# Patient Record
Sex: FEMALE | HISPANIC OR LATINO
[De-identification: names, ages, dates, MRNs, and addresses within clinical notes are randomized per-mention and may not be internally consistent; named-entity substitution may affect disease eponyms.]

---

## 2017-12-31 ENCOUNTER — CHARTING TRANS (OUTPATIENT)
Dept: OTHER | Age: 52
End: 2017-12-31

## 2018-01-04 ENCOUNTER — CHARTING TRANS (OUTPATIENT)
Dept: OTHER | Age: 53
End: 2018-01-04

## 2018-05-08 ENCOUNTER — CHARTING TRANS (OUTPATIENT)
Dept: OTHER | Age: 53
End: 2018-05-08

## 2018-07-20 ENCOUNTER — CHARTING TRANS (OUTPATIENT)
Dept: OTHER | Age: 53
End: 2018-07-20

## 2018-09-16 ENCOUNTER — CHARTING TRANS (OUTPATIENT)
Dept: OTHER | Age: 53
End: 2018-09-16

## 2018-11-23 ENCOUNTER — IMAGING SERVICES (OUTPATIENT)
Dept: OTHER | Age: 53
End: 2018-11-23

## 2019-01-24 ENCOUNTER — IMAGING SERVICES (OUTPATIENT)
Dept: OTHER | Age: 54
End: 2019-01-24

## 2019-02-14 ENCOUNTER — APPOINTMENT (OUTPATIENT)
Dept: GENERAL RADIOLOGY | Facility: HOSPITAL | Age: 54
DRG: 003 | End: 2019-02-14
Attending: NURSE PRACTITIONER
Payer: MEDICARE

## 2019-02-14 ENCOUNTER — APPOINTMENT (OUTPATIENT)
Dept: CT IMAGING | Facility: HOSPITAL | Age: 54
DRG: 003 | End: 2019-02-14
Attending: NURSE PRACTITIONER
Payer: MEDICARE

## 2019-02-14 ENCOUNTER — APPOINTMENT (OUTPATIENT)
Dept: GENERAL RADIOLOGY | Facility: HOSPITAL | Age: 54
DRG: 003 | End: 2019-02-14
Attending: HOSPITALIST
Payer: MEDICARE

## 2019-02-14 ENCOUNTER — APPOINTMENT (OUTPATIENT)
Dept: GENERAL RADIOLOGY | Facility: HOSPITAL | Age: 54
DRG: 003 | End: 2019-02-14
Attending: EMERGENCY MEDICINE
Payer: MEDICARE

## 2019-02-14 ENCOUNTER — APPOINTMENT (OUTPATIENT)
Dept: INTERVENTIONAL RADIOLOGY/VASCULAR | Facility: HOSPITAL | Age: 54
DRG: 003 | End: 2019-02-14
Attending: NURSE PRACTITIONER
Payer: MEDICARE

## 2019-02-14 ENCOUNTER — APPOINTMENT (OUTPATIENT)
Dept: CT IMAGING | Facility: HOSPITAL | Age: 54
DRG: 003 | End: 2019-02-14
Attending: EMERGENCY MEDICINE
Payer: MEDICARE

## 2019-02-14 ENCOUNTER — HOSPITAL ENCOUNTER (INPATIENT)
Facility: HOSPITAL | Age: 54
LOS: 23 days | Discharge: NURSING, REHAB OR LONG-TERM CARE | DRG: 003 | End: 2019-03-09
Attending: EMERGENCY MEDICINE | Admitting: HOSPITALIST
Payer: MEDICARE

## 2019-02-14 ENCOUNTER — APPOINTMENT (OUTPATIENT)
Dept: GENERAL RADIOLOGY | Facility: HOSPITAL | Age: 54
DRG: 003 | End: 2019-02-14
Attending: RADIOLOGY
Payer: MEDICARE

## 2019-02-14 DIAGNOSIS — Z86.73 H/O: CVA (CEREBROVASCULAR ACCIDENT): ICD-10-CM

## 2019-02-14 DIAGNOSIS — G25.3 MYOCLONUS: ICD-10-CM

## 2019-02-14 DIAGNOSIS — A41.9 SEPSIS, DUE TO UNSPECIFIED ORGANISM: ICD-10-CM

## 2019-02-14 DIAGNOSIS — K65.9: ICD-10-CM

## 2019-02-14 DIAGNOSIS — N39.0 URINARY TRACT INFECTION WITHOUT HEMATURIA: ICD-10-CM

## 2019-02-14 DIAGNOSIS — N39.0 URINARY TRACT INFECTION WITHOUT HEMATURIA, SITE UNSPECIFIED: Primary | ICD-10-CM

## 2019-02-14 DIAGNOSIS — L98.429: ICD-10-CM

## 2019-02-14 DIAGNOSIS — E03.9 HYPOTHYROIDISM: ICD-10-CM

## 2019-02-14 DIAGNOSIS — R56.9 SEIZURE (HCC): ICD-10-CM

## 2019-02-14 DIAGNOSIS — G40.901 STATUS EPILEPTICUS (HCC): ICD-10-CM

## 2019-02-14 DIAGNOSIS — N18.6 ESRD (END STAGE RENAL DISEASE) (HCC): ICD-10-CM

## 2019-02-14 DIAGNOSIS — T85.71XA: ICD-10-CM

## 2019-02-14 DIAGNOSIS — L89.159 PRESSURE INJURY OF SKIN OF SACRAL REGION, UNSPECIFIED INJURY STAGE: ICD-10-CM

## 2019-02-14 DIAGNOSIS — R41.82 ALTERED MENTAL STATUS, UNSPECIFIED ALTERED MENTAL STATUS TYPE: ICD-10-CM

## 2019-02-14 DIAGNOSIS — R63.30 FEEDING DIFFICULTIES: ICD-10-CM

## 2019-02-14 LAB
ALBUMIN SERPL-MCNC: 1.2 G/DL (ref 3.4–5)
ALBUMIN/GLOB SERPL: 0.2 {RATIO} (ref 1–2)
ALLENS TEST: POSITIVE
ALP LIVER SERPL-CCNC: 507 U/L (ref 41–108)
ALT SERPL-CCNC: 11 U/L (ref 13–56)
AMMONIA PLAS-MCNC: <10 UMOL/L (ref 11–32)
ANION GAP SERPL CALC-SCNC: 12 MMOL/L (ref 0–18)
ANION GAP SERPL CALC-SCNC: 9 MMOL/L (ref 0–18)
ARTERIAL BLD GAS O2 SATURATION: 96 % (ref 92–100)
ARTERIAL BLD GAS O2 SATURATION: 97 % (ref 92–100)
ARTERIAL BLD GAS O2 SATURATION: 97 % (ref 92–100)
ARTERIAL BLOOD GAS BASE EXCESS: -0.3
ARTERIAL BLOOD GAS BASE EXCESS: -1.5
ARTERIAL BLOOD GAS BASE EXCESS: -1.8
ARTERIAL BLOOD GAS HCO3: 21.8 MEQ/L (ref 22–26)
ARTERIAL BLOOD GAS HCO3: 23.3 MEQ/L (ref 22–26)
ARTERIAL BLOOD GAS HCO3: 23.4 MEQ/L (ref 22–26)
ARTERIAL BLOOD GAS PCO2: 32 MM HG (ref 35–45)
ARTERIAL BLOOD GAS PCO2: 34 MM HG (ref 35–45)
ARTERIAL BLOOD GAS PCO2: 38 MM HG (ref 35–45)
ARTERIAL BLOOD GAS PH: 7.4 (ref 7.35–7.45)
ARTERIAL BLOOD GAS PH: 7.45 (ref 7.35–7.45)
ARTERIAL BLOOD GAS PH: 7.45 (ref 7.35–7.45)
ARTERIAL BLOOD GAS PO2: 134 MM HG (ref 80–105)
ARTERIAL BLOOD GAS PO2: 164 MM HG (ref 80–105)
ARTERIAL BLOOD GAS PO2: 247 MM HG (ref 80–105)
AST SERPL-CCNC: 16 U/L (ref 15–37)
ATRIAL RATE: 67 BPM
BASOPHIL PERITONEAL FLUID: 0 %
BASOPHILS # BLD AUTO: 0.02 X10(3) UL (ref 0–0.2)
BASOPHILS # BLD AUTO: 0.05 X10(3) UL (ref 0–0.2)
BASOPHILS NFR BLD AUTO: 0.2 %
BASOPHILS NFR BLD AUTO: 0.4 %
BILIRUB SERPL-MCNC: 0.4 MG/DL (ref 0.1–2)
BILIRUB UR QL STRIP.AUTO: NEGATIVE
BUN BLD-MCNC: 46 MG/DL (ref 7–18)
BUN BLD-MCNC: 50 MG/DL (ref 7–18)
BUN/CREAT SERPL: 14.8 (ref 10–20)
BUN/CREAT SERPL: 15.6 (ref 10–20)
CALCIUM BLD-MCNC: 8.1 MG/DL (ref 8.5–10.1)
CALCIUM BLD-MCNC: 8.3 MG/DL (ref 8.5–10.1)
CALCULATED O2 SATURATION: 100 % (ref 92–100)
CALCULATED O2 SATURATION: 100 % (ref 92–100)
CALCULATED O2 SATURATION: 99 % (ref 92–100)
CARBOXYHEMOGLOBIN: 1.7 % SAT (ref 0–3)
CARBOXYHEMOGLOBIN: 1.7 % SAT (ref 0–3)
CARBOXYHEMOGLOBIN: 2 % SAT (ref 0–3)
CHLORIDE SERPL-SCNC: 97 MMOL/L (ref 98–107)
CHLORIDE SERPL-SCNC: 99 MMOL/L (ref 98–107)
CO2 SERPL-SCNC: 23 MMOL/L (ref 21–32)
CO2 SERPL-SCNC: 27 MMOL/L (ref 21–32)
COLOR UR AUTO: YELLOW
CREAT BLD-MCNC: 3.11 MG/DL (ref 0.55–1.02)
CREAT BLD-MCNC: 3.2 MG/DL (ref 0.55–1.02)
DEPRECATED HBV CORE AB SER IA-ACNC: 889 NG/ML (ref 18–340)
DEPRECATED RDW RBC AUTO: 85.3 FL (ref 35.1–46.3)
DEPRECATED RDW RBC AUTO: 87 FL (ref 35.1–46.3)
EOSINOPHIL # BLD AUTO: 0.04 X10(3) UL (ref 0–0.7)
EOSINOPHIL # BLD AUTO: 0.21 X10(3) UL (ref 0–0.7)
EOSINOPHIL NFR BLD AUTO: 0.4 %
EOSINOPHIL NFR BLD AUTO: 1.5 %
EOSINOPHILS PERITONEAL FLUID: 0 %
ERYTHROCYTE [DISTWIDTH] IN BLOOD BY AUTOMATED COUNT: 21.5 % (ref 11–15)
ERYTHROCYTE [DISTWIDTH] IN BLOOD BY AUTOMATED COUNT: 21.6 % (ref 11–15)
EST. AVERAGE GLUCOSE BLD GHB EST-MCNC: 111 MG/DL (ref 68–126)
FIO2: 40 %
FIO2: 40 %
GENTAMICIN SERPL-MCNC: 3.2 UG/ML
GLOBULIN PLAS-MCNC: 5.3 G/DL (ref 2.8–4.4)
GLUCOSE BLD-MCNC: 118 MG/DL (ref 70–99)
GLUCOSE BLD-MCNC: 232 MG/DL (ref 70–99)
GLUCOSE BLD-MCNC: 250 MG/DL (ref 70–99)
GLUCOSE BLD-MCNC: 283 MG/DL (ref 70–99)
GLUCOSE BLD-MCNC: 303 MG/DL (ref 70–99)
GLUCOSE BLD-MCNC: 67 MG/DL (ref 70–99)
GLUCOSE BLD-MCNC: 72 MG/DL (ref 70–99)
GLUCOSE BLD-MCNC: 88 MG/DL (ref 70–99)
GLUCOSE BLD-MCNC: 99 MG/DL (ref 70–99)
GLUCOSE UR STRIP.AUTO-MCNC: NEGATIVE MG/DL
HBA1C MFR BLD HPLC: 5.5 % (ref ?–5.7)
HCG SERPL QL: NEGATIVE
HCT VFR BLD AUTO: 24.6 % (ref 35–48)
HCT VFR BLD AUTO: 24.7 % (ref 35–48)
HGB BLD-MCNC: 7.4 G/DL (ref 12–16)
HGB BLD-MCNC: 7.6 G/DL (ref 12–16)
IMM GRANULOCYTES # BLD AUTO: 0.08 X10(3) UL (ref 0–1)
IMM GRANULOCYTES # BLD AUTO: 0.15 X10(3) UL (ref 0–1)
IMM GRANULOCYTES NFR BLD: 0.7 %
IMM GRANULOCYTES NFR BLD: 1.1 %
INR BLD: 0.93 (ref 0.9–1.1)
IONIZED CALCIUM: 1.11 MMOL/L (ref 1.12–1.32)
IRON SATURATION: 38 % (ref 15–50)
IRON SERPL-MCNC: 50 UG/DL (ref 50–170)
KETONES UR STRIP.AUTO-MCNC: NEGATIVE MG/DL
L/M: 5 L/MIN
LACTATE SERPL-SCNC: 1.6 MMOL/L (ref 0.4–2)
LACTATE SERPL-SCNC: 2.2 MMOL/L (ref 0.4–2)
LACTATE SERPL-SCNC: 2.7 MMOL/L (ref 0.4–2)
LACTIC ACID ARTERIAL: 1.5 MMOL/L (ref 0.5–2)
LYMPHOCYTE PERITONEAL FLUID: 37 %
LYMPHOCYTES # BLD AUTO: 1.13 X10(3) UL (ref 1–4)
LYMPHOCYTES # BLD AUTO: 2.05 X10(3) UL (ref 1–4)
LYMPHOCYTES NFR BLD AUTO: 10.4 %
LYMPHOCYTES NFR BLD AUTO: 14.4 %
M PROTEIN MFR SERPL ELPH: 6.5 G/DL (ref 6.4–8.2)
MCH RBC QN AUTO: 33.3 PG (ref 26–34)
MCH RBC QN AUTO: 33.8 PG (ref 26–34)
MCHC RBC AUTO-ENTMCNC: 30 G/DL (ref 31–37)
MCHC RBC AUTO-ENTMCNC: 30.9 G/DL (ref 31–37)
MCV RBC AUTO: 109.3 FL (ref 80–100)
MCV RBC AUTO: 111.3 FL (ref 80–100)
METHEMOGLOBIN: 0.4 % SAT (ref 0.4–1.5)
METHEMOGLOBIN: 0.5 % SAT (ref 0.4–1.5)
METHEMOGLOBIN: 0.5 % SAT (ref 0.4–1.5)
MONO/MAC/HISTIO PERITONEAL: 42 %
MONOCYTES # BLD AUTO: 0.7 X10(3) UL (ref 0.1–1)
MONOCYTES # BLD AUTO: 1.27 X10(3) UL (ref 0.1–1)
MONOCYTES NFR BLD AUTO: 6.4 %
MONOCYTES NFR BLD AUTO: 8.9 %
NEUTROPHILS # BLD AUTO: 10.46 X10 (3) UL (ref 1.5–7.7)
NEUTROPHILS # BLD AUTO: 10.46 X10(3) UL (ref 1.5–7.7)
NEUTROPHILS # BLD AUTO: 8.9 X10 (3) UL (ref 1.5–7.7)
NEUTROPHILS # BLD AUTO: 8.9 X10(3) UL (ref 1.5–7.7)
NEUTROPHILS NFR BLD AUTO: 73.7 %
NEUTROPHILS NFR BLD AUTO: 81.9 %
NEUTROPHILS PERITONEAL FLUID: 21 %
NITRITE UR QL STRIP.AUTO: NEGATIVE
OSMOLALITY SERPL CALC.SUM OF ELEC: 290 MOSM/KG (ref 275–295)
OSMOLALITY SERPL CALC.SUM OF ELEC: 299 MOSM/KG (ref 275–295)
P AXIS: 59 DEGREES
P-R INTERVAL: 150 MS
PATIENT TEMPERATURE: 97.1 F
PATIENT TEMPERATURE: 98.6 F
PATIENT TEMPERATURE: 99 F
PEEP: 5 CM H2O
PEEP: 5 CM H2O
PH UR STRIP.AUTO: 7 [PH] (ref 4.5–8)
PHENYTOIN SERPL-MCNC: 18.1 UG/ML (ref 10–20)
PLATELET # BLD AUTO: 734 10(3)UL (ref 150–450)
PLATELET # BLD AUTO: 809 10(3)UL (ref 150–450)
POTASSIUM BLOOD GAS: 3.3 MMOL/L (ref 3.6–5.1)
POTASSIUM SERPL-SCNC: 3 MMOL/L (ref 3.5–5.1)
POTASSIUM SERPL-SCNC: 3.2 MMOL/L (ref 3.5–5.1)
PROT UR STRIP.AUTO-MCNC: 100 MG/DL
PSA SERPL DL<=0.01 NG/ML-MCNC: 12.8 SECONDS (ref 12.4–14.7)
Q-T INTERVAL: 448 MS
QRS DURATION: 94 MS
QTC CALCULATION (BEZET): 473 MS
R AXIS: 39 DEGREES
RBC # BLD AUTO: 2.22 X10(6)UL (ref 3.8–5.3)
RBC # BLD AUTO: 2.25 X10(6)UL (ref 3.8–5.3)
RBC #/AREA URNS AUTO: >10 /HPF
RBC PERITONEAL FLUID: <3000 /MM3
SODIUM BLOOD GAS: 133 MMOL/L (ref 136–144)
SODIUM SERPL-SCNC: 133 MMOL/L (ref 136–145)
SODIUM SERPL-SCNC: 134 MMOL/L (ref 136–145)
SP GR UR STRIP.AUTO: 1.04 (ref 1–1.03)
T AXIS: 133 DEGREES
TIDAL VOLUME: 400 ML
TIDAL VOLUME: 400 ML
TOTAL CELLS COUNTED: 100
TOTAL HEMOGLOBIN: 7.8 G/DL (ref 11.7–16)
TOTAL HEMOGLOBIN: 8 G/DL (ref 11.7–16)
TOTAL HEMOGLOBIN: 8.2 G/DL (ref 11.7–16)
TOTAL IRON BINDING CAPACITY: 133 UG/DL (ref 240–450)
TRANSFERRIN SERPL-MCNC: 89 MG/DL (ref 200–360)
TRIGL SERPL-MCNC: 444 MG/DL (ref 30–149)
UROBILINOGEN UR STRIP.AUTO-MCNC: <2 MG/DL
VANCOMYCIN SERPL-MCNC: 16.6 UG/ML
VENT RATE: 16 /MIN
VENT RATE: 16 /MIN
VENTRICULAR RATE: 67 BPM
WBC # BLD AUTO: 10.9 X10(3) UL (ref 4–11)
WBC # BLD AUTO: 14.2 X10(3) UL (ref 4–11)
WBC #/AREA URNS AUTO: >50 /HPF
WBC CLUMPS UR QL AUTO: PRESENT
WBC PERITONEAL FLUID: 299 /MM3

## 2019-02-14 PROCEDURE — 99223 1ST HOSP IP/OBS HIGH 75: CPT | Performed by: INTERNAL MEDICINE

## 2019-02-14 PROCEDURE — B548ZZA ULTRASONOGRAPHY OF SUPERIOR VENA CAVA, GUIDANCE: ICD-10-PCS | Performed by: RADIOLOGY

## 2019-02-14 PROCEDURE — 95816 EEG AWAKE AND DROWSY: CPT | Performed by: OTHER

## 2019-02-14 PROCEDURE — 71045 X-RAY EXAM CHEST 1 VIEW: CPT | Performed by: NURSE PRACTITIONER

## 2019-02-14 PROCEDURE — 70450 CT HEAD/BRAIN W/O DYE: CPT | Performed by: EMERGENCY MEDICINE

## 2019-02-14 PROCEDURE — 71045 X-RAY EXAM CHEST 1 VIEW: CPT | Performed by: RADIOLOGY

## 2019-02-14 PROCEDURE — 99222 1ST HOSP IP/OBS MODERATE 55: CPT | Performed by: STUDENT IN AN ORGANIZED HEALTH CARE EDUCATION/TRAINING PROGRAM

## 2019-02-14 PROCEDURE — 02HV33Z INSERTION OF INFUSION DEVICE INTO SUPERIOR VENA CAVA, PERCUTANEOUS APPROACH: ICD-10-PCS | Performed by: RADIOLOGY

## 2019-02-14 PROCEDURE — 71045 X-RAY EXAM CHEST 1 VIEW: CPT | Performed by: EMERGENCY MEDICINE

## 2019-02-14 PROCEDURE — 5A1955Z RESPIRATORY VENTILATION, GREATER THAN 96 CONSECUTIVE HOURS: ICD-10-PCS | Performed by: ANESTHESIOLOGY

## 2019-02-14 PROCEDURE — 99223 1ST HOSP IP/OBS HIGH 75: CPT | Performed by: OTHER

## 2019-02-14 PROCEDURE — 71045 X-RAY EXAM CHEST 1 VIEW: CPT | Performed by: HOSPITALIST

## 2019-02-14 PROCEDURE — 0BH17EZ INSERTION OF ENDOTRACHEAL AIRWAY INTO TRACHEA, VIA NATURAL OR ARTIFICIAL OPENING: ICD-10-PCS | Performed by: ANESTHESIOLOGY

## 2019-02-14 PROCEDURE — 99291 CRITICAL CARE FIRST HOUR: CPT | Performed by: HOSPITALIST

## 2019-02-14 PROCEDURE — 70450 CT HEAD/BRAIN W/O DYE: CPT | Performed by: NURSE PRACTITIONER

## 2019-02-14 RX ORDER — LORAZEPAM 2 MG/ML
1 INJECTION INTRAMUSCULAR ONCE
Status: COMPLETED | OUTPATIENT
Start: 2019-02-14 | End: 2019-02-14

## 2019-02-14 RX ORDER — LIDOCAINE HYDROCHLORIDE 40 MG/ML
SOLUTION TOPICAL DAILY
COMMUNITY
End: 2019-02-14

## 2019-02-14 RX ORDER — LEVOTHYROXINE SODIUM 0.12 MG/1
125 TABLET ORAL
Status: DISCONTINUED | OUTPATIENT
Start: 2019-02-14 | End: 2019-02-26

## 2019-02-14 RX ORDER — POTASSIUM CHLORIDE 14.9 MG/ML
20 INJECTION INTRAVENOUS ONCE
Status: DISCONTINUED | OUTPATIENT
Start: 2019-02-14 | End: 2019-02-14

## 2019-02-14 RX ORDER — CHLORHEXIDINE GLUCONATE 0.12 MG/ML
15 RINSE ORAL
Status: DISCONTINUED | OUTPATIENT
Start: 2019-02-14 | End: 2019-03-09

## 2019-02-14 RX ORDER — ACETAMINOPHEN 325 MG/1
650 TABLET ORAL EVERY 8 HOURS PRN
Status: ON HOLD | COMMUNITY
End: 2019-03-09

## 2019-02-14 RX ORDER — POLYETHYLENE GLYCOL 3350 17 G/17G
17 POWDER, FOR SOLUTION ORAL DAILY PRN
Status: DISCONTINUED | OUTPATIENT
Start: 2019-02-14 | End: 2019-02-14

## 2019-02-14 RX ORDER — LEVOTHYROXINE SODIUM 0.12 MG/1
125 TABLET ORAL
Status: ON HOLD | COMMUNITY
End: 2019-03-09

## 2019-02-14 RX ORDER — PHENYTOIN SODIUM 50 MG/ML
100 INJECTION, SOLUTION INTRAMUSCULAR; INTRAVENOUS EVERY 8 HOURS SCHEDULED
Status: DISCONTINUED | OUTPATIENT
Start: 2019-02-14 | End: 2019-02-16

## 2019-02-14 RX ORDER — TRAMADOL HYDROCHLORIDE 50 MG/1
50 TABLET ORAL 3 TIMES DAILY
Status: ON HOLD | COMMUNITY
End: 2019-03-09

## 2019-02-14 RX ORDER — SODIUM CHLORIDE 9 MG/ML
INJECTION, SOLUTION INTRAVENOUS CONTINUOUS
Status: DISCONTINUED | OUTPATIENT
Start: 2019-02-14 | End: 2019-02-14

## 2019-02-14 RX ORDER — BACLOFEN 5 MG/1
5 TABLET ORAL 2 TIMES DAILY
Status: ON HOLD | COMMUNITY
End: 2019-03-09

## 2019-02-14 RX ORDER — BISACODYL 10 MG
10 SUPPOSITORY, RECTAL RECTAL
Status: DISCONTINUED | OUTPATIENT
Start: 2019-02-14 | End: 2019-02-14

## 2019-02-14 RX ORDER — SODIUM CHLORIDE 9 MG/ML
INJECTION, SOLUTION INTRAVENOUS CONTINUOUS
Status: DISCONTINUED | OUTPATIENT
Start: 2019-02-14 | End: 2019-02-15

## 2019-02-14 RX ORDER — LIDOCAINE 4 G/G
1 PATCH TOPICAL DAILY
Status: ON HOLD | COMMUNITY
End: 2019-03-09

## 2019-02-14 RX ORDER — METOCLOPRAMIDE HYDROCHLORIDE 5 MG/ML
5 INJECTION INTRAMUSCULAR; INTRAVENOUS EVERY 8 HOURS PRN
Status: DISCONTINUED | OUTPATIENT
Start: 2019-02-14 | End: 2019-02-25

## 2019-02-14 RX ORDER — ISOSORBIDE MONONITRATE 10 MG/1
10 TABLET ORAL 2 TIMES DAILY
Status: ON HOLD | COMMUNITY
End: 2019-03-09

## 2019-02-14 RX ORDER — ACETAMINOPHEN 325 MG/1
650 TABLET ORAL EVERY 6 HOURS PRN
Status: DISCONTINUED | OUTPATIENT
Start: 2019-02-14 | End: 2019-02-26

## 2019-02-14 RX ORDER — DEXTROSE MONOHYDRATE 25 G/50ML
50 INJECTION, SOLUTION INTRAVENOUS
Status: DISCONTINUED | OUTPATIENT
Start: 2019-02-14 | End: 2019-03-09

## 2019-02-14 RX ORDER — ISOSORBIDE DINITRATE 10 MG/1
10 TABLET ORAL 2 TIMES DAILY
COMMUNITY
End: 2019-02-14

## 2019-02-14 RX ORDER — ONDANSETRON 2 MG/ML
4 INJECTION INTRAMUSCULAR; INTRAVENOUS EVERY 6 HOURS PRN
Status: DISCONTINUED | OUTPATIENT
Start: 2019-02-14 | End: 2019-03-09

## 2019-02-14 RX ORDER — VIT B COMP NO.3/FOLIC/C/BIOTIN 1 MG-60 MG
1 TABLET ORAL
Status: ON HOLD | COMMUNITY
End: 2019-02-14

## 2019-02-14 RX ORDER — BUMETANIDE 2 MG/1
2 TABLET ORAL DAILY
Status: ON HOLD | COMMUNITY
End: 2019-03-09

## 2019-02-14 RX ORDER — CLOPIDOGREL BISULFATE 75 MG/1
75 TABLET ORAL DAILY
Status: DISCONTINUED | OUTPATIENT
Start: 2019-02-14 | End: 2019-02-26

## 2019-02-14 RX ORDER — FOLIC ACID 1 MG/1
1 TABLET ORAL DAILY
COMMUNITY

## 2019-02-14 RX ORDER — GABAPENTIN 100 MG/1
200 CAPSULE ORAL 3 TIMES DAILY
Status: ON HOLD | COMMUNITY
End: 2019-03-09

## 2019-02-14 RX ORDER — HYDROCODONE BITARTRATE AND ACETAMINOPHEN 5; 325 MG/1; MG/1
1 TABLET ORAL EVERY 8 HOURS PRN
Status: ON HOLD | COMMUNITY
End: 2019-03-09

## 2019-02-14 RX ORDER — ERGOCALCIFEROL (VITAMIN D2) 1250 MCG
50000 CAPSULE ORAL WEEKLY
Status: ON HOLD | COMMUNITY
End: 2019-03-09

## 2019-02-14 RX ORDER — BISACODYL 10 MG
10 SUPPOSITORY, RECTAL RECTAL
Status: DISCONTINUED | OUTPATIENT
Start: 2019-02-14 | End: 2019-03-09

## 2019-02-14 RX ORDER — METOCLOPRAMIDE 5 MG/1
5 TABLET ORAL EVERY 8 HOURS
Status: ON HOLD | COMMUNITY
End: 2019-03-09

## 2019-02-14 RX ORDER — ACETAMINOPHEN 325 MG/1
650 TABLET ORAL EVERY 6 HOURS PRN
Status: DISCONTINUED | OUTPATIENT
Start: 2019-02-14 | End: 2019-02-14

## 2019-02-14 RX ORDER — POLYETHYLENE GLYCOL 3350 17 G/17G
17 POWDER, FOR SOLUTION ORAL DAILY PRN
Status: DISCONTINUED | OUTPATIENT
Start: 2019-02-14 | End: 2019-03-09

## 2019-02-14 RX ORDER — CLONAZEPAM 0.5 MG/1
0.5 TABLET ORAL SEE ADMIN INSTRUCTIONS
Status: ON HOLD | COMMUNITY
End: 2019-03-09

## 2019-02-14 RX ORDER — CALCIUM CARBONATE 200(500)MG
500 TABLET,CHEWABLE ORAL 2 TIMES DAILY
Status: DISCONTINUED | OUTPATIENT
Start: 2019-02-14 | End: 2019-02-15

## 2019-02-14 RX ORDER — BUMETANIDE 2 MG/1
2 TABLET ORAL DAILY
Status: DISCONTINUED | OUTPATIENT
Start: 2019-02-14 | End: 2019-02-14

## 2019-02-14 RX ORDER — CLOPIDOGREL BISULFATE 75 MG/1
75 TABLET ORAL DAILY
Status: ON HOLD | COMMUNITY
End: 2019-03-09

## 2019-02-14 RX ORDER — URSODIOL 300 MG/1
300 CAPSULE ORAL EVERY 8 HOURS
Status: ON HOLD | COMMUNITY
End: 2019-03-09

## 2019-02-14 RX ORDER — ACETAMINOPHEN 650 MG/1
650 SUPPOSITORY RECTAL EVERY 6 HOURS PRN
Status: DISCONTINUED | OUTPATIENT
Start: 2019-02-14 | End: 2019-02-26

## 2019-02-14 RX ORDER — HEPARIN SODIUM 5000 [USP'U]/ML
5000 INJECTION, SOLUTION INTRAVENOUS; SUBCUTANEOUS EVERY 12 HOURS SCHEDULED
Status: DISPENSED | OUTPATIENT
Start: 2019-02-14 | End: 2019-03-05

## 2019-02-14 RX ORDER — PANTOPRAZOLE SODIUM 40 MG/1
1 GRANULE, DELAYED RELEASE ORAL
Status: ON HOLD | COMMUNITY
End: 2019-03-09

## 2019-02-14 RX ORDER — ACETAMINOPHEN 160 MG/5ML
650 SOLUTION ORAL EVERY 6 HOURS PRN
Status: DISCONTINUED | OUTPATIENT
Start: 2019-02-14 | End: 2019-02-26

## 2019-02-14 RX ORDER — METRONIDAZOLE 500 MG/1
500 TABLET ORAL EVERY 8 HOURS
Status: ON HOLD | COMMUNITY
Start: 2019-02-05 | End: 2019-03-09

## 2019-02-14 RX ORDER — LOPERAMIDE HYDROCHLORIDE 2 MG/1
1 TABLET ORAL EVERY 12 HOURS PRN
Status: ON HOLD | COMMUNITY
End: 2019-03-09

## 2019-02-14 RX ORDER — FOLIC ACID/VIT B COMPLEX AND C 0.8 MG
0.8 TABLET ORAL DAILY
COMMUNITY

## 2019-02-14 RX ORDER — LORAZEPAM 2 MG/ML
INJECTION INTRAMUSCULAR
Status: COMPLETED
Start: 2019-02-14 | End: 2019-02-14

## 2019-02-14 RX ORDER — HEPARIN SODIUM 5000 [USP'U]/ML
5000 INJECTION, SOLUTION INTRAVENOUS; SUBCUTANEOUS 2 TIMES DAILY
Status: ON HOLD | COMMUNITY
End: 2019-03-09

## 2019-02-14 RX ORDER — BISACODYL 10 MG
10 SUPPOSITORY, RECTAL RECTAL
Status: ON HOLD | COMMUNITY
End: 2019-03-09

## 2019-02-14 RX ORDER — BACLOFEN 10 MG/1
5 TABLET ORAL 2 TIMES DAILY
COMMUNITY
End: 2019-02-14

## 2019-02-14 RX ORDER — MODAFINIL 100 MG/1
100 TABLET ORAL DAILY
Status: ON HOLD | COMMUNITY
End: 2019-03-09

## 2019-02-14 RX ORDER — ONDANSETRON HYDROCHLORIDE 24 MG/1
4 TABLET, FILM COATED ORAL EVERY 6 HOURS PRN
Status: ON HOLD | COMMUNITY
End: 2019-03-09

## 2019-02-14 RX ORDER — MIDAZOLAM HYDROCHLORIDE 1 MG/ML
2 INJECTION INTRAMUSCULAR; INTRAVENOUS EVERY 5 MIN PRN
Status: DISCONTINUED | OUTPATIENT
Start: 2019-02-14 | End: 2019-03-09

## 2019-02-14 RX ORDER — MIDODRINE HYDROCHLORIDE 10 MG/1
10 TABLET ORAL EVERY 8 HOURS PRN
Status: ON HOLD | COMMUNITY
End: 2019-03-09

## 2019-02-14 RX ORDER — LOPERAMIDE HCL 1 MG/7.5ML
2 SOLUTION ORAL 2 TIMES DAILY PRN
Status: ON HOLD | COMMUNITY
End: 2019-02-14

## 2019-02-14 RX ORDER — FOLIC ACID 1 MG/1
1 TABLET ORAL DAILY
Status: DISCONTINUED | OUTPATIENT
Start: 2019-02-14 | End: 2019-02-14

## 2019-02-14 NOTE — PROGRESS NOTES
[facility]  Progress Note     Nohemi Able Patient Status:  Inpatient    1965 MRN [id number]   [facility] 4NW-A Attending Freddy Ramos MD   Hosp Day # 0 PCP Alicia Antonio MD     Chief Complaint: RR for seizing    S: Patient UTI  3. Initially conern was for CVA- after ED clarified with NH it appears EMS called due to lethargy ~ 5 pm   4. ABG reviewed  5.  Patient started seizing as soon as she arrived to the floor, Ativan iv given and patient was intubated for airway protection

## 2019-02-14 NOTE — BRIEF PROCEDURE NOTE
Called for intubation of ESRD with hx of seizures in Respitratory distress  Eyes open, obtunded, being bag masked.   120/78, O2 sat 10%  K3.2    Etomidate 10mg   Succinylcholine 60mg  glidescope3   x1 attempt, atraumatic  +bbs, +etco2  Tube taped at 21cm at

## 2019-02-14 NOTE — PHYSICAL THERAPY NOTE
PT order received, chart reviewed. Pt with RR d/t seizure this AM, with intubation and transfer to ICU in progress. Pt will require new PT order when pt is appropriate to participate in skilled therapy, including during ICU stay.

## 2019-02-14 NOTE — ED NOTES
Report given to Med/Onc Charge RN - Vu Luke x [phone] at [phone]. Pt is going to Rm. 407, bed is still unavailable at this time. Will page transport when the bed is ready.

## 2019-02-14 NOTE — VASCULAR ACCESS
To pt's room to assess for IV access. Both right and left upper and lower arms assessed with ultrasound for IV access. Unable to see any viable vein to access. Pt's nurse, Margrett Lesches notified.

## 2019-02-14 NOTE — CM/SW NOTE
Med Records from [address] [place] [address] both received and placed on chart.   Naz Whelan, 02/14/19, 3:49 PM

## 2019-02-14 NOTE — ED NOTES
9852 FA[address] Carrasquillo NH Nurse called here for an update regarding the Pt. She stated that she will call the Pt's Son to update him that Pt is being admitted here.

## 2019-02-14 NOTE — ED INITIAL ASSESSMENT (HPI)
Pt from [address] home for evaluation. pts last known normal was 1700 tonight.      Pt presences with right sided weakness       Pt has hx of stroke with left side deficit, per EMS pts normal is she is able to make some sounds

## 2019-02-14 NOTE — CONSULTS
[facility] Note:  Renal Dose Adjustment for Metoclopramide (REGLAN)    Hipolito Paulino has been prescribed Metoclopramide (REGLAN) 10 mg every 8 hours as needed for nausea. Estimated Creatinine Clearance: 16.9 mL/min (A) (based on SCr of 3.11 mg/dL (H)).

## 2019-02-14 NOTE — CM/SW NOTE
Requested by RN to get STAT medical records on this critically ill patient. Yeimi Hood): phone [phone]; fax [phone]. Marta Adrian [place]) : phone [phone]; fax [phone]    Faxed critical illness request LUI to both above.

## 2019-02-14 NOTE — CONSULTS
INFECTIOUS DISEASE CONSULTATION    Kylah Escudero Patient Status:  Inpatient    1965 MRN [id number]   [facility] 4SW-A Attending Larry Cortez MD   Hosp Day # 0 PCP Ventura Pierre MD injection 25 mcg, 25 mcg, Intravenous, Q30 Min PRN **OR** fentaNYL citrate (SUBLIMAZE) 0.05 MG/ML injection 50 mcg, 50 mcg, Intravenous, Q30 Min PRN  •  acetaminophen (TYLENOL) tab 650 mg, 650 mg, Oral, Q6H PRN **OR** acetaminophen (TYLENOL) 160 MG/5ML ora MG Oral Tab EC 5 mg Q24H PRN for constipation. At hs via PEG TUBE  Disp:  Rfl:    bumetanide 2 MG Oral Tab 2 mg daily. Per PEG TUBE  Disp:  Rfl:    ClonazePAM 0.5 MG Oral Tab 0.5 mg by Per G Tube route See Admin Instructions.  Yelena Knight, Sat for anxiety 30 m (ARANESP, ALBUMIN FREE,) 40 MCG/ML Injection Solution Inject 1 mL into the skin one time. During HD 2/14/19 for 1 day Disp:  Rfl:    Baclofen 5 MG Oral Tab Take 5 mg by mouth 2 (two) times daily.  Disp:  Rfl:    insulin glargine (BASAGLAR KWIKPEN) 100 UNIT/ cath  Musculoskeletal: Full range of motion of all extremities. No swelling noted. Joints: no effusions  Skin: No lesions.  No erythema, no open wounds      Laboratory Data:      Recent Labs   Lab  02/14/19   0201   RBC  2.25*   HGB  7.6*   HCT  24.6*   M aspiration sputum culture to be sent    4.  Wounds/buttocks appear clean cont local wound care        Tasha Vila MD  [facility]  [phone]

## 2019-02-14 NOTE — CONSULTS
[id number] [facility] Initial Consultation    Jameycrys Bob Patient Status:  Inpatient    1965 MRN [id number]   [facility] 4SW-A Attending Ck Herr MD   Hosp Day # 0 PCP Dao Farooq MD     REASON FOR EVALUATION: Joselin Butler Metoclopramide HCl (REGLAN) injection 5 mg 5 mg Intravenous Q8H PRN   Calcium Carbonate Antacid (TUMS) chewable tab 500 mg 500 mg Per G Tube BID   [START ON 2/15/2019] GENTAMICIN 100MG IV PREMIX 60 mg 60 mg Intravenous Once per day on Mon Wed Fri   insul flexpen 2-10 Units 2-10 Units Subcutaneous TID CC and HS   [START ON 2/15/2019] epoetin geetha (EPOGEN,PROCRIT) injection 10,000 Units 10,000 Units Intravenous Once in dialysis       REVIEW OF SYSTEMS:  A 10-point system was reviewed.   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

## 2019-02-14 NOTE — ED NOTES
Salomón Barkers here in the ED and was made aware of Pt's K level of 3.2 \"It's OK. She's on dialysis. \". No further orders were given.

## 2019-02-14 NOTE — H&P
[facility]  History and Physical     Jaleesa Babb Patient Status:  Emergency    1965 MRN [id number]   Location [address] [facility] Attending Tai Chinchilla, [facility] Day # 0 PCP Ventura Hernandez MD     Chief Complaint: Jazmine Avila Take 100 mg by mouth 3 (three) times daily. Disp:  Rfl:    Gentamicin in Saline (GENTAMICIN 100MG IV PREMIX) 60 mg. Disp:  Rfl:    Heparin Sodium, Porcine, 5000 UNIT/ML Injection Solution Inject 5,000 Units into the skin 2 (two) times daily.  Disp:  Rfl: /72   Pulse 82   Temp 97.1 °F (36.2 °C) (Rectal)   Resp 10   Ht 5' 3\" (1.6 m)   Wt 180 lb (81.6 kg)   SpO2 96%   BMI 31.89 kg/m²   General: No acute distress. Alert and oriented x 3. HEENT: Normocephalic atraumatic. Moist mucous membranes.  EOM-I

## 2019-02-14 NOTE — ED NOTES
Received pt sleeping, arousable to painful stimuli, VS noted WNL. Awaiting room in unit to be cleaned for transfer. Daughter in law at bedside, updated on bed assignment. Fluids infusing per MD order.

## 2019-02-14 NOTE — ED NOTES
ED Provider - Dr. Germaine gomes to [place] Nurse for report. Per nurse, they called the ambulance because \"Pt has been less alert than usual... Pt is normally moans and states 1-2 words, since 5PM she was noticed to be more lethargic.  Pt has history of left-

## 2019-02-14 NOTE — PROCEDURES
[facility]  Procedure Note    Tova Leone Patient Status:  Inpatient    1965 MRN [id number]   Location [address] [facility] Attending Jesus Soto MD   Hosp Day # 0 PCP Jaci Espinoza MD     Procedure: Triple lumen catheter chris

## 2019-02-14 NOTE — PROCEDURES
[address] EEG report    Name: Gonzales Zheng    Date of Study: 2/14/2019      Routine EEG Report    Ordering Physician: Marco A Velaqzuez MD                              Primary Care Physician: Elsa Canseco MD    Technical Aspects Oral Daily PRN   bisacodyl (DULCOLAX) rectal suppository 10 mg 10 mg Rectal Daily PRN   Chlorhexidine Gluconate (PERIDEX) 0.12 % solution 15 mL 15 mL Mouth/Throat Roro@[employer]   Midazolam HCl (VERSED) 2 MG/2ML injection 2 mg 2 mg Intravenous Q5 Min PRN noted    Hyperventilation and photic stimulation were not performed     Epileptiform activity: None    Seizures: none    Events: none    Impression:    This EEG is abnormal due to diffuse irregular continuous slowing, with intermixed frequent bursts of epil

## 2019-02-14 NOTE — CONSULTS
[facility]  Report of Consultation    Sagelindy Bentley Patient Status:  Inpatient    1965 MRN [id number]   [facility] 4SW-A Attending Funmilayo Shelby MD   Hosp Day # 0 PCP Chano Nichole MD     Reason for Consultation:  Altered menta • Esophageal reflux    • Essential hypertension    • Osteomyelitis (HCC)    • Stroke [facility])    • Thyroid disease      No family history on file. reports that  has never smoked.  she has never used smokeless tobacco.    Allergies:  No Known Allergies MG Oral Tab 75 mg daily. PEG-TUBE  Disp:  Rfl:     Pantoprazole Sodium (PROTONIX) 40 MG Oral Powd Pack 1 Package by Peg Tube route. Disp:  Rfl:     TraMADol HCl 50 MG Oral Tab 50 mg 3 (three) times daily.  Per peg tube  Disp:  Rfl:     ursodiol 300 MG Ora form of dialysis and no mention made of recent diarrhea issues.   She is an ongoing wound attention  No mention of use of oxygen in the orders from the nursing      Vital signs in last 24 hours:  Patient Vitals for the past 24 hrs:   BP Temp Temp src Pulse ischemia   Skin: As above   Neurological: Left hand appears contracted greater than right with left leg without focal movement  Seems to respond to pain currently no active seizure    Lab Data Review:  Lab Results   Component Value Date    WBC 10.9 02/14/2 EF on record 9/17 of 40% with normal RV        Plan at this time to continue full ventilatory support  EEG ongoing with neuro consult pending-interim propofol for seizure control  Awaiting broad-spectrum cultures  As per renal service  Further recommendati

## 2019-02-14 NOTE — ED PROVIDER NOTES
Patient Seen in: [facility] Emergency Department    History   Patient presents with:  Stroke (neurologic)    Stated Complaint: stroke    HPI    52-year-old female with history of diabetes mellitus, hypertension, end-stage renal disease on dialysis, extraocular muscles are intact, there is no scleral icterus. Mucous membranes are moist, oropharynx is clear, uvula midline. Tympanic membranes clear bilaterally, there is no mass or erythema or swelling bilaterally. Scalp is atraumatic.   NECK: Neck is MORPHOLOGY SCAN - Abnormal; Notable for the following components:    RBC Morphology See morphology below (*)     Platelet Morphology See morphology below (*)     Clumped Platelets 2+ (*)     All other components within normal limits   POCT GLUCOSE - Abnorm Technologist): Additional Information (per Vision Radiologist): AMS, possible urosepsis, history of CVA, no new focal deficits tonight      CT HEAD      IMPRESSION:  No acute intracranial hemorrhage, mass-effect, or midline shift.   Old right PCA territ (primary encounter diagnosis)  Urinary tract infection without hematuria, site unspecified  ESRD (end stage renal disease) (HCC)  Pressure injury of skin of sacral region, unspecified injury stage    Disposition:  Admit  2/14/2019  4:32 am    Follow-up:  N

## 2019-02-14 NOTE — PROGRESS NOTES
[id number] [facility] Preliminary Note    Delma Russell Patient Status:  Inpatient    1965 MRN [id number]   [facility] 4SW-A Attending Jacob Chaudhry MD   Hosp Day # 0 PCP Billy Amos MD     REASON FOR EVALUATION: Generalize tab 500 mg 500 mg Per G Tube BID   [START ON 2/15/2019] GENTAMICIN 100MG IV PREMIX 60 mg 60 mg Intravenous Once per day on Mon Wed Fri   insulin detemir (LEVEMIR) 100 UNIT/ML flextouch 20 Units 20 Units Subcutaneous Daily   propofol (DIPRIVAN) 10 MG/ML inj name.  +gag per RN  Per chart review, NH staff reports that she has chronic L-sided weakness, has mild strength RUE. This author unable to assess currently.       DIAGNOSTIC DATA:  Labs:  Recent Labs   Lab  02/14/19   0201   RBC  2.25*   HGB  7.6*   HCT  2

## 2019-02-14 NOTE — PROGRESS NOTES
At around 0810 pt came to floor from ED, unresponsive, rm 407 was not ready yet, pt is contact for VRE in the urine, per transport pt was already twitching when he picked the pt from ed, CN stated upon transferring pt to bed, pt's twitching was worsening a

## 2019-02-14 NOTE — PROGRESS NOTES
[facility]      Sepsis Reassessment Note    /54   Pulse 81   Temp 98.9 °F (37.2 °C) (Skin)   Resp 17   Ht 160 cm (5' 3\")   Wt 180 lb (81.6 kg)   SpO2 100%   BMI 31.89 kg/m²      5:29 PM    Cardiac:  Regularity: Regular  Rate: Normal  Heart So

## 2019-02-14 NOTE — CONSULTS
[facility]  Report of Consultation    Osvaldo Do Patient Status:  Inpatient    1965 MRN [id number]   [facility] 4SW-A Attending Shannon Almaguer MD   Hosp Day # 0 PCP Guy Schwab, MD       Assessment / Plan:    1) ESRD- due to diab smoked.  she has never used smokeless tobacco.    Allergies:  No Known Allergies    Medications:    Current Facility-Administered Medications:   •  Clopidogrel Bisulfate (PLAVIX) tab 75 mg, 75 mg, Oral, Daily  •  Levothyroxine Sodium (SYNTHROID, LEVOTHROID) Intravenous, Continuous  •  CefTRIAXone Sodium (ROCEPHIN) 1 g in sodium chloride 0.9% 100 mL MBP/ADD-vantage, 1 g, Intravenous, Q24H  •  glucose (DEX4) oral liquid 15 g, 15 g, Oral, Q15 Min PRN **OR** Glucose-Vitamin C (DEX-4) 4-6 GM-MG chewable tab 4 tabl 27.0 02/14/2019     02/14/2019    CA 8.1 02/14/2019    ALB 1.2 02/14/2019    ALKPHO 507 02/14/2019    BILT 0.4 02/14/2019    TP 6.5 02/14/2019    AST 16 02/14/2019    ALT 11 02/14/2019    INR 0.93 02/14/2019    PTP 12.8 02/14/2019    PGLU 232 02/14/

## 2019-02-15 ENCOUNTER — APPOINTMENT (OUTPATIENT)
Dept: GENERAL RADIOLOGY | Facility: HOSPITAL | Age: 54
DRG: 003 | End: 2019-02-15
Attending: INTERNAL MEDICINE
Payer: MEDICARE

## 2019-02-15 ENCOUNTER — APPOINTMENT (OUTPATIENT)
Dept: GENERAL RADIOLOGY | Facility: HOSPITAL | Age: 54
DRG: 003 | End: 2019-02-15
Attending: NURSE PRACTITIONER
Payer: MEDICARE

## 2019-02-15 LAB
ALBUMIN SERPL-MCNC: 1.1 G/DL (ref 3.4–5)
ALBUMIN/GLOB SERPL: 0.2 {RATIO} (ref 1–2)
ALLENS TEST: POSITIVE
ALP LIVER SERPL-CCNC: 310 U/L (ref 41–108)
ALT SERPL-CCNC: 11 U/L (ref 13–56)
ANION GAP SERPL CALC-SCNC: 10 MMOL/L (ref 0–18)
ANION GAP SERPL CALC-SCNC: 8 MMOL/L (ref 0–18)
APTT PPP: 38.5 SECONDS (ref 26.1–34.6)
ARTERIAL BLD GAS O2 SATURATION: 95 % (ref 92–100)
ARTERIAL BLOOD GAS BASE EXCESS: -3
ARTERIAL BLOOD GAS HCO3: 21 MEQ/L (ref 22–26)
ARTERIAL BLOOD GAS PCO2: 34 MM HG (ref 35–45)
ARTERIAL BLOOD GAS PH: 7.42 (ref 7.35–7.45)
ARTERIAL BLOOD GAS PO2: 99 MM HG (ref 80–105)
AST SERPL-CCNC: 12 U/L (ref 15–37)
BASOPHILS # BLD AUTO: 0.07 X10(3) UL (ref 0–0.2)
BASOPHILS NFR BLD AUTO: 0.4 %
BILIRUB SERPL-MCNC: 0.4 MG/DL (ref 0.1–2)
BUN BLD-MCNC: 20 MG/DL (ref 7–18)
BUN BLD-MCNC: 53 MG/DL (ref 7–18)
BUN/CREAT SERPL: 12 (ref 10–20)
BUN/CREAT SERPL: 16.1 (ref 10–20)
CALCIUM BLD-MCNC: 7.9 MG/DL (ref 8.5–10.1)
CALCIUM BLD-MCNC: 8.1 MG/DL (ref 8.5–10.1)
CALCULATED O2 SATURATION: 98 % (ref 92–100)
CARBOXYHEMOGLOBIN: 1.9 % SAT (ref 0–3)
CHLORIDE SERPL-SCNC: 101 MMOL/L (ref 98–107)
CHLORIDE SERPL-SCNC: 102 MMOL/L (ref 98–107)
CO2 SERPL-SCNC: 24 MMOL/L (ref 21–32)
CO2 SERPL-SCNC: 27 MMOL/L (ref 21–32)
CREAT BLD-MCNC: 1.67 MG/DL (ref 0.55–1.02)
CREAT BLD-MCNC: 3.29 MG/DL (ref 0.55–1.02)
DEPRECATED RDW RBC AUTO: 86.8 FL (ref 35.1–46.3)
DEPRECATED RDW RBC AUTO: 87.7 FL (ref 35.1–46.3)
EOSINOPHIL # BLD AUTO: 0.13 X10(3) UL (ref 0–0.7)
EOSINOPHIL NFR BLD AUTO: 0.7 %
ERYTHROCYTE [DISTWIDTH] IN BLOOD BY AUTOMATED COUNT: 21.3 % (ref 11–15)
ERYTHROCYTE [DISTWIDTH] IN BLOOD BY AUTOMATED COUNT: 21.8 % (ref 11–15)
FIO2: 30 %
GLOBULIN PLAS-MCNC: 5.1 G/DL (ref 2.8–4.4)
GLUCOSE BLD-MCNC: 106 MG/DL (ref 70–99)
GLUCOSE BLD-MCNC: 107 MG/DL (ref 70–99)
GLUCOSE BLD-MCNC: 113 MG/DL (ref 70–99)
GLUCOSE BLD-MCNC: 115 MG/DL (ref 70–99)
GLUCOSE BLD-MCNC: 115 MG/DL (ref 70–99)
GLUCOSE BLD-MCNC: 126 MG/DL (ref 70–99)
GLUCOSE BLD-MCNC: 131 MG/DL (ref 70–99)
GLUCOSE BLD-MCNC: 146 MG/DL (ref 70–99)
GLUCOSE BLD-MCNC: 46 MG/DL (ref 70–99)
GLUCOSE BLD-MCNC: 73 MG/DL (ref 70–99)
GLUCOSE BLD-MCNC: 84 MG/DL (ref 70–99)
GLUCOSE BLD-MCNC: 91 MG/DL (ref 70–99)
GLUCOSE BLD-MCNC: 96 MG/DL (ref 70–99)
HAV IGM SER QL: 2.5 MG/DL (ref 1.6–2.6)
HCT VFR BLD AUTO: 22.8 % (ref 35–48)
HCT VFR BLD AUTO: 24.2 % (ref 35–48)
HGB BLD-MCNC: 7.1 G/DL (ref 12–16)
HGB BLD-MCNC: 7.2 G/DL (ref 12–16)
IMM GRANULOCYTES # BLD AUTO: 0.16 X10(3) UL (ref 0–1)
IMM GRANULOCYTES NFR BLD: 0.9 %
INR BLD: 1.03 (ref 0.9–1.1)
LACTATE SERPL-SCNC: 1.5 MMOL/L (ref 0.4–2)
LYMPHOCYTES # BLD AUTO: 2.24 X10(3) UL (ref 1–4)
LYMPHOCYTES NFR BLD AUTO: 12 %
M PROTEIN MFR SERPL ELPH: 6.2 G/DL (ref 6.4–8.2)
MCH RBC QN AUTO: 33 PG (ref 26–34)
MCH RBC QN AUTO: 34.5 PG (ref 26–34)
MCHC RBC AUTO-ENTMCNC: 29.8 G/DL (ref 31–37)
MCHC RBC AUTO-ENTMCNC: 31.1 G/DL (ref 31–37)
MCV RBC AUTO: 110.7 FL (ref 80–100)
MCV RBC AUTO: 111 FL (ref 80–100)
METHEMOGLOBIN: 0.5 % SAT (ref 0.4–1.5)
MONOCYTES # BLD AUTO: 1.46 X10(3) UL (ref 0.1–1)
MONOCYTES NFR BLD AUTO: 7.8 %
NEUTROPHILS # BLD AUTO: 14.66 X10 (3) UL (ref 1.5–7.7)
NEUTROPHILS # BLD AUTO: 14.66 X10(3) UL (ref 1.5–7.7)
NEUTROPHILS NFR BLD AUTO: 78.2 %
OSMOLALITY SERPL CALC.SUM OF ELEC: 288 MOSM/KG (ref 275–295)
OSMOLALITY SERPL CALC.SUM OF ELEC: 295 MOSM/KG (ref 275–295)
PATIENT TEMPERATURE: 99.2 F
PEEP: 5 CM H2O
PHOSPHATE SERPL-MCNC: 2.5 MG/DL (ref 2.5–4.9)
PLATELET # BLD AUTO: 777 10(3)UL (ref 150–450)
PLATELET # BLD AUTO: 867 10(3)UL (ref 150–450)
POTASSIUM SERPL-SCNC: 3.1 MMOL/L (ref 3.5–5.1)
POTASSIUM SERPL-SCNC: 3.3 MMOL/L (ref 3.5–5.1)
PSA SERPL DL<=0.01 NG/ML-MCNC: 13.9 SECONDS (ref 12.4–14.7)
RBC # BLD AUTO: 2.06 X10(6)UL (ref 3.8–5.3)
RBC # BLD AUTO: 2.18 X10(6)UL (ref 3.8–5.3)
SODIUM SERPL-SCNC: 135 MMOL/L (ref 136–145)
SODIUM SERPL-SCNC: 137 MMOL/L (ref 136–145)
TIDAL VOLUME: 400 ML
TOTAL HEMOGLOBIN: 7.6 G/DL (ref 11.7–16)
TRIGL SERPL-MCNC: 321 MG/DL (ref 30–149)
VENT RATE: 14 /MIN
WBC # BLD AUTO: 18.7 X10(3) UL (ref 4–11)
WBC # BLD AUTO: 21.3 X10(3) UL (ref 4–11)

## 2019-02-15 PROCEDURE — 5A1D70Z PERFORMANCE OF URINARY FILTRATION, INTERMITTENT, LESS THAN 6 HOURS PER DAY: ICD-10-PCS | Performed by: INTERNAL MEDICINE

## 2019-02-15 PROCEDURE — 99291 CRITICAL CARE FIRST HOUR: CPT | Performed by: OTHER

## 2019-02-15 PROCEDURE — 71045 X-RAY EXAM CHEST 1 VIEW: CPT | Performed by: INTERNAL MEDICINE

## 2019-02-15 PROCEDURE — 99233 SBSQ HOSP IP/OBS HIGH 50: CPT | Performed by: INTERNAL MEDICINE

## 2019-02-15 PROCEDURE — 99233 SBSQ HOSP IP/OBS HIGH 50: CPT | Performed by: HOSPITALIST

## 2019-02-15 PROCEDURE — 95951 EEG MONITORING/VIDEORECORD: CPT | Performed by: OTHER

## 2019-02-15 PROCEDURE — 71045 X-RAY EXAM CHEST 1 VIEW: CPT | Performed by: NURSE PRACTITIONER

## 2019-02-15 RX ORDER — ALBUMIN (HUMAN) 12.5 G/50ML
SOLUTION INTRAVENOUS
Status: DISPENSED
Start: 2019-02-15 | End: 2019-02-16

## 2019-02-15 RX ORDER — DEXTROSE AND SODIUM CHLORIDE 5; .9 G/100ML; G/100ML
INJECTION, SOLUTION INTRAVENOUS CONTINUOUS
Status: DISCONTINUED | OUTPATIENT
Start: 2019-02-15 | End: 2019-02-16

## 2019-02-15 RX ORDER — IPRATROPIUM BROMIDE AND ALBUTEROL SULFATE 2.5; .5 MG/3ML; MG/3ML
3 SOLUTION RESPIRATORY (INHALATION)
Status: DISCONTINUED | OUTPATIENT
Start: 2019-02-15 | End: 2019-02-15

## 2019-02-15 RX ORDER — SODIUM HYPOCHLORITE 2.5 MG/ML
SOLUTION TOPICAL 2 TIMES DAILY
Status: DISCONTINUED | OUTPATIENT
Start: 2019-02-15 | End: 2019-03-09

## 2019-02-15 RX ORDER — IPRATROPIUM BROMIDE AND ALBUTEROL SULFATE 2.5; .5 MG/3ML; MG/3ML
3 SOLUTION RESPIRATORY (INHALATION) EVERY 4 HOURS PRN
Status: DISCONTINUED | OUTPATIENT
Start: 2019-02-15 | End: 2019-03-09

## 2019-02-15 RX ORDER — POTASSIUM CHLORIDE 14.9 MG/ML
20 INJECTION INTRAVENOUS ONCE
Status: COMPLETED | OUTPATIENT
Start: 2019-02-15 | End: 2019-02-15

## 2019-02-15 RX ORDER — ALBUMIN (HUMAN) 12.5 G/50ML
25 SOLUTION INTRAVENOUS ONCE
Status: COMPLETED | OUTPATIENT
Start: 2019-02-15 | End: 2019-02-15

## 2019-02-15 RX ORDER — HEPARIN SODIUM 1000 [USP'U]/ML
2000 INJECTION, SOLUTION INTRAVENOUS; SUBCUTANEOUS ONCE
Status: COMPLETED | OUTPATIENT
Start: 2019-02-15 | End: 2019-02-15

## 2019-02-15 NOTE — CONSULTS
[facility]  Inpatient Wound Care Contact Note    Antonietta Gear Patient Status:  Inpatient    1965 MRN [id number]   [facility] 4SW-A Attending Elyssa Bolanos MD   Hosp Day # 1 PCP Jose Alberto Kaba MD     Attempted to see patient for Padma Hinton [phone]  Spectralink: [phone]  Wound pager [phone]

## 2019-02-15 NOTE — PLAN OF CARE
NEUROLOGICAL - ADULT    • Achieves stable or improved neurological status Not Progressing    • Absence of seizures Not Progressing        RESPIRATORY - ADULT    • Achieves optimal ventilation and oxygenation Not Progressing        SKIN/TISSUE INTEGRITY - A

## 2019-02-15 NOTE — PROGRESS NOTES
[facility] Progress Note     SUBJECTIVE/24H Events: All events, procedures, notes reviewed. Vasopressors weaned off this morning. Remains unresponsive to verbal and tactile stimuli.  No other acut Extremities: RLE with no edema          Lab Data Review:   Recent Labs   Lab  02/14/19   0201  02/14/19   1638  02/15/19   0426   GLU  303*  67*  115*   BUN  46*  50*  53*   CREATSERUM  3.11*  3.20*  3.29*   GFRAA  19*  18*  17*   GFRNAA  16*  16*  15* Collection Time: 02/14/19  2:53 AM   Result Value Ref Range    Blood Culture Result No Growth 1 Day N/A     Recent Labs   Lab  02/14/19   0323   COLORUR  Yellow   CLARITY  Cloudy*   SPECGRAVITY  1.043*   GLUUR  Negative   BILUR  Negative   KETUR  Negative pneumothorax. Dictated by: Ramakrishna Vieira MD on 2/14/2019 at 17:31     Approved by: Ramakrishna Vieira MD            Xr Chest Ap Portable  (cpt=71045)    Result Date: 2/14/2019  CONCLUSION:  1.   Left IJ central venous catheter tip is in the central I performed at patient's bedside. Recommend routine catheter care.      Dictated by: Monika Womack MD on 2/14/2019 at 16:43     Approved by: Monika Womack MD              • potassium chloride  20 mEq Intravenous Once   • Clopidogrel Bisulfate  75 mg Kem Veliz negative thus far  Continue ceftriaxone with concern for UTI; ID following  Appreciate neuro input; will review with neuro regarding plans for possible MRI or other testing aside from EEG and AED management  Continue to wean vent as tolerated; holding on w

## 2019-02-15 NOTE — PROGRESS NOTES
02/15/19 1403   Clinical Encounter Type   Visited With Patient   Sacramental Encounters   Sacrament of Sick-Anointing Anointed   The patient was seen by Liz Reynoso. Received prayer, Scripture, support and Sacrament of the Sick.

## 2019-02-15 NOTE — DIETARY NOTE
NUTRITION INITIAL ASSESSMENT    Pt is at moderate nutrition risk. Pt does not meet malnutrition criteria.     NUTRITION DIAGNOSIS/PROBLEM:    Inadequate oral intake related to inability to consume sufficient energy as evidenced by the need for g tube feedin

## 2019-02-15 NOTE — PROGRESS NOTES
[id number] [facility] Progress Note    Ranjeet Alston Patient Status:  Inpatient    1965 MRN [id number]   [facility] 4SW-A Attending Jem Xiong MD   Hosp Day # 1 PCP Becki Barreto MD         Subjective:  Ranjeet Alston is a(n) 52 abnormal due to diffuse irregular continuous slowing, with intermixed frequent bursts of epileptiform activity with intermixed abundant triphasic waves.      This pattern may be seen in post anoxic injury, or myoclonic status epilepticus     Record was con

## 2019-02-15 NOTE — PLAN OF CARE
NEUROLOGICAL - ADULT    • Achieves stable or improved neurological status Not Progressing        RESPIRATORY - ADULT    • Achieves optimal ventilation and oxygenation Not Progressing          NEUROLOGICAL - ADULT    • Absence of seizures Progressing

## 2019-02-15 NOTE — PROGRESS NOTES
[facility]  Progress Note     Kendra Gum Patient Status:  Inpatient    1965 MRN [id number]   [facility] 4NW-A Attending Dr. Magdiel De Day # 1 PCP Tali Marquez MD     Chief Complaint: unable to obtain 2/2 AMS    S: Harper Subcutaneous 2 times per day   • insulin detemir  20 Units Subcutaneous Daily   • docusate sodium  100 mg Oral BID   • Chlorhexidine Gluconate  15 mL Mouth/Throat [email or web address]   • pantoprazole (PROTONIX) IV push  40 mg Intravenous QAM AC   • Insulin Aspar

## 2019-02-15 NOTE — PROGRESS NOTES
[facility]                INFECTIOUS DISEASE PROGRESS NOTE    Phylils Gaming Patient Status:  Inpatient    1965 MRN [id number]   [facility] 4SW-A Attending Ric Cisneros MD   Hosp Day # 1 PCP Patricia Valdez MD     Antibiotics:van 6.2*       No results found for: [facility]  Microbiology  CELL COUNT/DIFF PERITONEAL FL Once [[id number]] Collected: 02/14/19 1410   Specimen:  Body fluid, unspecified Updated: 02/14/19 1535    Neutrophils Peritoneal 21 %     Lymphocyte Peritoneal 37 %     Mono Sepsis ([facility]     Seizure ([facility]     Status epilepticus (HCC)     Myoclonus      ASSESSMENT/PLAN:  1.  ESRD  Previously PD, but now on HD  --- PD fluid shows no signs of active infection: PMN 86, gram stain negative  Was receiving vanc, gent at NH -- levels

## 2019-02-15 NOTE — PROGRESS NOTES
[facility]  Nephrology Progress Note    Kylah Escudero Attending:  Larry Cortez MD       Assessment and Plan:    1) ESRD- due to diabetes; PD has been held due to recent placement of PEG and has been on HD over last approx 2 weeks.  No documented h/o perit K 3.1 02/15/2019     02/15/2019    CO2 24.0 02/15/2019     02/15/2019    CA 7.9 02/15/2019    ALB 1.1 02/15/2019    ALKPHO 310 02/15/2019    BILT 0.4 02/15/2019    TP 6.2 02/15/2019    AST 12 02/15/2019    ALT 11 02/15/2019    PTT 38.5 02/1 solution 15 mL 15 mL Mouth/Throat Be@[employer]   Midazolam HCl (VERSED) 2 MG/2ML injection 2 mg 2 mg Intravenous Q5 Min PRN   Pantoprazole Sodium (PROTONIX) 40 mg in Sodium Chloride 0.9 % 10 mL IV push 40 mg Intravenous QAM AC   propofol (DIPRIVAN) infusi

## 2019-02-16 ENCOUNTER — APPOINTMENT (OUTPATIENT)
Dept: GENERAL RADIOLOGY | Facility: HOSPITAL | Age: 54
DRG: 003 | End: 2019-02-16
Attending: INTERNAL MEDICINE
Payer: MEDICARE

## 2019-02-16 LAB
ALLENS TEST: POSITIVE
ALLENS TEST: POSITIVE
ANION GAP SERPL CALC-SCNC: 8 MMOL/L (ref 0–18)
ANTIBODY SCREEN: NEGATIVE
ARTERIAL BLD GAS O2 SATURATION: 95 % (ref 92–100)
ARTERIAL BLD GAS O2 SATURATION: 96 % (ref 92–100)
ARTERIAL BLOOD GAS BASE EXCESS: -0.6
ARTERIAL BLOOD GAS BASE EXCESS: 0.2
ARTERIAL BLOOD GAS HCO3: 23.2 MEQ/L (ref 22–26)
ARTERIAL BLOOD GAS HCO3: 24.1 MEQ/L (ref 22–26)
ARTERIAL BLOOD GAS PCO2: 34 MM HG (ref 35–45)
ARTERIAL BLOOD GAS PCO2: 35 MM HG (ref 35–45)
ARTERIAL BLOOD GAS PH: 7.45 (ref 7.35–7.45)
ARTERIAL BLOOD GAS PH: 7.46 (ref 7.35–7.45)
ARTERIAL BLOOD GAS PO2: 93 MM HG (ref 80–105)
ARTERIAL BLOOD GAS PO2: 97 MM HG (ref 80–105)
BUN BLD-MCNC: 20 MG/DL (ref 7–18)
BUN/CREAT SERPL: 10.4 (ref 10–20)
CALCIUM BLD-MCNC: 7.8 MG/DL (ref 8.5–10.1)
CALCULATED O2 SATURATION: 98 % (ref 92–100)
CALCULATED O2 SATURATION: 98 % (ref 92–100)
CARBOXYHEMOGLOBIN: 1.8 % SAT (ref 0–3)
CARBOXYHEMOGLOBIN: 1.8 % SAT (ref 0–3)
CHLORIDE SERPL-SCNC: 105 MMOL/L (ref 98–107)
CO2 SERPL-SCNC: 26 MMOL/L (ref 21–32)
CREAT BLD-MCNC: 1.93 MG/DL (ref 0.55–1.02)
DEPRECATED RDW RBC AUTO: 85.6 FL (ref 35.1–46.3)
ERYTHROCYTE [DISTWIDTH] IN BLOOD BY AUTOMATED COUNT: 21.2 % (ref 11–15)
FIO2: 30 %
FIO2: 30 %
FOLATE SERPL-MCNC: 61.4 NG/ML (ref 8.7–?)
GLUCOSE BLD-MCNC: 128 MG/DL (ref 70–99)
GLUCOSE BLD-MCNC: 164 MG/DL (ref 70–99)
GLUCOSE BLD-MCNC: 171 MG/DL (ref 70–99)
GLUCOSE BLD-MCNC: 200 MG/DL (ref 70–99)
GLUCOSE BLD-MCNC: 206 MG/DL (ref 70–99)
GLUCOSE BLD-MCNC: 231 MG/DL (ref 70–99)
HCT VFR BLD AUTO: 22.1 % (ref 35–48)
HGB BLD-MCNC: 6.9 G/DL (ref 12–16)
HGB BLD-MCNC: 8 G/DL (ref 12–16)
MCH RBC QN AUTO: 34.5 PG (ref 26–34)
MCHC RBC AUTO-ENTMCNC: 31.2 G/DL (ref 31–37)
MCV RBC AUTO: 110.5 FL (ref 80–100)
METHEMOGLOBIN: 0.5 % SAT (ref 0.4–1.5)
METHEMOGLOBIN: 0.6 % SAT (ref 0.4–1.5)
OSMOLALITY SERPL CALC.SUM OF ELEC: 296 MOSM/KG (ref 275–295)
PATIENT TEMPERATURE: 98.6 F
PATIENT TEMPERATURE: 98.6 F
PEEP: 5 CM H2O
PEEP: 5 CM H2O
PHENYTOIN SERPL-MCNC: 10.3 UG/ML (ref 10–20)
PLATELET # BLD AUTO: 733 10(3)UL (ref 150–450)
POTASSIUM SERPL-SCNC: 3.3 MMOL/L (ref 3.5–5.1)
RBC # BLD AUTO: 2 X10(6)UL (ref 3.8–5.3)
RH BLOOD TYPE: POSITIVE
SODIUM SERPL-SCNC: 139 MMOL/L (ref 136–145)
T4 FREE SERPL-MCNC: 1.4 NG/DL (ref 0.8–1.7)
TIDAL VOLUME: 325 ML
TIDAL VOLUME: 400 ML
TOTAL HEMOGLOBIN: 5.3 G/DL (ref 11.7–16)
TOTAL HEMOGLOBIN: 7.9 G/DL (ref 11.7–16)
TSI SER-ACNC: 2.06 MIU/ML (ref 0.36–3.74)
VENT RATE: 12 /MIN
VENT RATE: 14 /MIN
VIT B12 SERPL-MCNC: 1057 PG/ML (ref 193–986)
WBC # BLD AUTO: 23 X10(3) UL (ref 4–11)

## 2019-02-16 PROCEDURE — 95951 EEG MONITORING/VIDEORECORD: CPT | Performed by: OTHER

## 2019-02-16 PROCEDURE — 99233 SBSQ HOSP IP/OBS HIGH 50: CPT | Performed by: INTERNAL MEDICINE

## 2019-02-16 PROCEDURE — 99291 CRITICAL CARE FIRST HOUR: CPT | Performed by: OTHER

## 2019-02-16 PROCEDURE — 30233N1 TRANSFUSION OF NONAUTOLOGOUS RED BLOOD CELLS INTO PERIPHERAL VEIN, PERCUTANEOUS APPROACH: ICD-10-PCS | Performed by: HOSPITALIST

## 2019-02-16 PROCEDURE — 71045 X-RAY EXAM CHEST 1 VIEW: CPT | Performed by: INTERNAL MEDICINE

## 2019-02-16 PROCEDURE — 99233 SBSQ HOSP IP/OBS HIGH 50: CPT | Performed by: HOSPITALIST

## 2019-02-16 RX ORDER — ARIPIPRAZOLE 15 MG/1
40 TABLET ORAL ONCE
Status: COMPLETED | OUTPATIENT
Start: 2019-02-16 | End: 2019-02-16

## 2019-02-16 RX ORDER — METOCLOPRAMIDE HYDROCHLORIDE 5 MG/ML
5 INJECTION INTRAMUSCULAR; INTRAVENOUS EVERY 12 HOURS SCHEDULED
Status: DISCONTINUED | OUTPATIENT
Start: 2019-02-16 | End: 2019-02-23

## 2019-02-16 RX ORDER — SODIUM CHLORIDE 9 MG/ML
INJECTION, SOLUTION INTRAVENOUS ONCE
Status: COMPLETED | OUTPATIENT
Start: 2019-02-16 | End: 2019-02-16

## 2019-02-16 RX ORDER — IPRATROPIUM BROMIDE AND ALBUTEROL SULFATE 2.5; .5 MG/3ML; MG/3ML
3 SOLUTION RESPIRATORY (INHALATION)
Status: DISCONTINUED | OUTPATIENT
Start: 2019-02-16 | End: 2019-02-19

## 2019-02-16 NOTE — PROCEDURES
Date of Procedure: 2/14/2019-2/16/2019    Procedure: EEG (ELECTROENCEPHALOGRAM)   START TIME:2/14/2019    END TIME: 2/16/2019   DURATION 48 hours  DX: Status epilepticus  HX: PT IS A 54 YO FEMALE WHO PRESENTED TO THE ED ON 2/14/2019 FOR AMS AND LETHARGY.  H and gradually improved towards the end of recording. Some were superimposed with triphasic waves. There were also a pattern of burst suppression to certain degree.  Myoclonic jerking movement significantly resolved gradually as depakote and versed were star

## 2019-02-16 NOTE — PROGRESS NOTES
INFECTIOUS DISEASE PROGRESS NOTE    Jamey Bob Patient Status:  Inpatient    1965 MRN [id number]   [facility] 4SW-A Attending Jennifer Zapata MD   Hosp Day # 2 PCP Dao Farooq MD     Subjective: No acute events o/n.   PD drainage growi injury of skin of sacral region, unspecified injury stage     Sepsis ([address]     Seizure ([address]     Status epilepticus ([address]     Myoclonus      ASSESSMENT/PLAN:  1.  ESRD  - Previously PD, but now on HD  - PD fluid shows no signs of active infection: PMN 86, g

## 2019-02-16 NOTE — PROGRESS NOTES
Pt started to desat during shift report, RT called and responded to bedside. Called and updated valente Pimentel orders. Pt responded well to RT treatments and maintaining sats now.

## 2019-02-16 NOTE — PLAN OF CARE
Upon assessment pt resting in bed intubated and sedated with versed infusing. Continous EEG in place, no signs of twitching. Per Neuro d/c eeg and transition pt from versed to propofol.  Pt received one unit prbc, tolerated well, no sings of active bleeding

## 2019-02-16 NOTE — PROGRESS NOTES
INPATIENT NEUROLOGY PROGRESS NOTE    Date: 2/16/2019    Wilman Bob is a 52year old female at Hospital Day ( LOS: 2 days )    Assessment:   Status epilepticus: EEG improved  New stroke cannot be ruled out (unable to have MRI, will repeat CT head)  Toxic me Before breakfast   Heparin Sodium (Porcine) 5000 UNIT/ML injection 5,000 Units 5,000 Units Subcutaneous 2 times per day   ondansetron HCl (ZOFRAN) injection 4 mg 4 mg Intravenous Q6H PRN   Metoclopramide HCl (REGLAN) injection 5 mg 5 mg Intravenous Q8H PRN Pulse 103   Temp 98.6 °F (37 °C)   Resp 16   Ht 63\"   Wt 139 lb 12.4 oz   SpO2 100%   BMI 24.76 kg/m²   Neurological Examination:  Mental status: unresponsive intubated on vent  Language: LARISA (unable to assess)  Speech: LARISA  CN II-XII: left pupil enlarged

## 2019-02-16 NOTE — PROGRESS NOTES
[facility]  Progress Note    Maye Denver Patient Status:  Inpatient    1965 MRN [id number]   [facility] 4SW-A Attending Nasreen Kurtz MD   Hosp Day # 2 PCP Mandy Masterson MD     Subjective:  Maye Denver is a(n) 52year old female. 02/14/19   0201  02/14/19   1637  02/15/19   0426  02/15/19   2010  02/16/19   0454   RBC  2.25*  2.22*  2.18*  2.06*  2.00*   HGB  7.6*  7.4*  7.2*  7.1*  6.9*   HCT  24.6*  24.7*  24.2*  22.8*  22.1*   MCV  109.3*  111.3*  111.0*  110.7*  110.5TRI [facility] normal RV     PLAN  Continue to monitor hemodynamics in the intensive care unit  Notify infectious disease of the yeast in the peritoneal fluid culture  Continue ceftriaxone with concern for UTI; ID following  Appreciate neuro input; will review with neuro

## 2019-02-16 NOTE — PROGRESS NOTES
[facility]  Progress Note     Lee Goldsmith Patient Status:  Inpatient    1965 MRN [id number]   [facility] 4SW-A Attending Sue Mujica MD   Hosp Day # 2 PCP Ny Burnette MD     Chief Complaint: lethargy    S: Patient is intuba Estimated Creatinine Clearance: 27.2 mL/min (A) (based on SCr of 1.93 mg/dL (H)). Recent Labs   Lab  02/14/19   0201  02/15/19   0426   PTP  12.8  13.9   INR  0.93  1.03       No results for input(s): TROP, CK in the last 168 hours.     Lab Results

## 2019-02-16 NOTE — PROGRESS NOTES
02/16/19 1101   Vent Information   $ RT Standby Charge (per 15 min) 1   Ventilator Initiation 02/14/19   Ventilation Day(s) 3   Interface Invasive   Vent Type PB   Vent ID 4   Vent Mode VC+   Settings   FiO2 (%) 30 %   Resp Rate (Set) 12   Vt (Set, mL)

## 2019-02-16 NOTE — PROGRESS NOTES
[facility]  Nephrology Progress Note    Kendra Bond Attending:  Johnnie Mar MD       Assessment and Plan:    1) ESRD- due to diabetes; PD has been held due to recent placement of PEG and has been on HD over last approx 2 weeks.  No documented h/o perit BUN 20 02/16/2019     02/16/2019    K 3.3 02/16/2019     02/16/2019    CO2 26.0 02/16/2019     02/16/2019    CA 7.8 02/16/2019    PGLU 206 02/16/2019       Imaging: All imaging studies reviewed.     Meds:     Current Facility-Administ Pantoprazole Sodium (PROTONIX) 40 mg in Sodium Chloride 0.9 % 10 mL IV push 40 mg Intravenous QAM AC   propofol (DIPRIVAN) infusion 5-100 mcg/kg/min (Dosing Weight) Intravenous Continuous   glucose (DEX4) oral liquid 15 g 15 g Oral Q15 Min PRN   Or

## 2019-02-16 NOTE — PLAN OF CARE
Diabetes/Glucose Control    • Glucose maintained within prescribed range Not Progressing        NEUROLOGICAL - ADULT    • Achieves stable or improved neurological status Not Progressing    • Absence of seizures Not Progressing    • Remains free of injury r

## 2019-02-17 ENCOUNTER — APPOINTMENT (OUTPATIENT)
Dept: GENERAL RADIOLOGY | Facility: HOSPITAL | Age: 54
DRG: 003 | End: 2019-02-17
Attending: INTERNAL MEDICINE
Payer: MEDICARE

## 2019-02-17 ENCOUNTER — APPOINTMENT (OUTPATIENT)
Dept: CT IMAGING | Facility: HOSPITAL | Age: 54
DRG: 003 | End: 2019-02-17
Attending: Other
Payer: MEDICARE

## 2019-02-17 LAB
ALBUMIN SERPL-MCNC: 1.5 G/DL (ref 3.4–5)
ALBUMIN/GLOB SERPL: 0.3 {RATIO} (ref 1–2)
ALLENS TEST: POSITIVE
ALP LIVER SERPL-CCNC: 305 U/L (ref 41–108)
ALT SERPL-CCNC: 13 U/L (ref 13–56)
AMMONIA PLAS-MCNC: 15 UMOL/L (ref 11–32)
ANION GAP SERPL CALC-SCNC: 8 MMOL/L (ref 0–18)
ARTERIAL BLD GAS O2 SATURATION: 92 % (ref 92–100)
ARTERIAL BLOOD GAS BASE EXCESS: -1.4
ARTERIAL BLOOD GAS HCO3: 23.5 MEQ/L (ref 22–26)
ARTERIAL BLOOD GAS PCO2: 40 MM HG (ref 35–45)
ARTERIAL BLOOD GAS PH: 7.39 (ref 7.35–7.45)
ARTERIAL BLOOD GAS PO2: 60 MM HG (ref 80–105)
AST SERPL-CCNC: 11 U/L (ref 15–37)
ATRIAL RATE: 100 BPM
BASOPHILS # BLD AUTO: 0.09 X10(3) UL (ref 0–0.2)
BASOPHILS NFR BLD AUTO: 0.4 %
BILIRUB SERPL-MCNC: 0.4 MG/DL (ref 0.1–2)
BLOOD TYPE BARCODE: 1700
BUN BLD-MCNC: 26 MG/DL (ref 7–18)
BUN/CREAT SERPL: 10.5 (ref 10–20)
C DIFF TOX B STL QL: NEGATIVE
CALCIUM BLD-MCNC: 7.8 MG/DL (ref 8.5–10.1)
CALCULATED O2 SATURATION: 91 % (ref 92–100)
CARBOXYHEMOGLOBIN: 1.8 % SAT (ref 0–3)
CHLORIDE SERPL-SCNC: 107 MMOL/L (ref 98–107)
CO2 SERPL-SCNC: 26 MMOL/L (ref 21–32)
CREAT BLD-MCNC: 2.47 MG/DL (ref 0.55–1.02)
DEPRECATED RDW RBC AUTO: 95.4 FL (ref 35.1–46.3)
EOSINOPHIL # BLD AUTO: 0.04 X10(3) UL (ref 0–0.7)
EOSINOPHIL NFR BLD AUTO: 0.2 %
ERYTHROCYTE [DISTWIDTH] IN BLOOD BY AUTOMATED COUNT: 25.6 % (ref 11–15)
FIO2: 30 %
GLOBULIN PLAS-MCNC: 5.3 G/DL (ref 2.8–4.4)
GLUCOSE BLD-MCNC: 156 MG/DL (ref 70–99)
GLUCOSE BLD-MCNC: 201 MG/DL (ref 70–99)
GLUCOSE BLD-MCNC: 84 MG/DL (ref 70–99)
HCT VFR BLD AUTO: 28.3 % (ref 35–48)
HGB BLD-MCNC: 8.6 G/DL (ref 12–16)
IMM GRANULOCYTES # BLD AUTO: 0.26 X10(3) UL (ref 0–1)
IMM GRANULOCYTES NFR BLD: 1.2 %
LYMPHOCYTES # BLD AUTO: 1.74 X10(3) UL (ref 1–4)
LYMPHOCYTES NFR BLD AUTO: 8 %
M PROTEIN MFR SERPL ELPH: 6.8 G/DL (ref 6.4–8.2)
MCH RBC QN AUTO: 31.7 PG (ref 26–34)
MCHC RBC AUTO-ENTMCNC: 30.4 G/DL (ref 31–37)
MCV RBC AUTO: 104.4 FL (ref 80–100)
METHEMOGLOBIN: 0.5 % SAT (ref 0.4–1.5)
MONOCYTES # BLD AUTO: 1.45 X10(3) UL (ref 0.1–1)
MONOCYTES NFR BLD AUTO: 6.6 %
NEUTROPHILS # BLD AUTO: 18.3 X10 (3) UL (ref 1.5–7.7)
NEUTROPHILS # BLD AUTO: 18.3 X10(3) UL (ref 1.5–7.7)
NEUTROPHILS NFR BLD AUTO: 83.6 %
OSMOLALITY SERPL CALC.SUM OF ELEC: 302 MOSM/KG (ref 275–295)
P AXIS: 74 DEGREES
P-R INTERVAL: 142 MS
PATIENT TEMPERATURE: 97.9 F
PEEP: 5 CM H2O
PLATELET # BLD AUTO: 684 10(3)UL (ref 150–450)
PLATELET MORPHOLOGY: NORMAL
POTASSIUM SERPL-SCNC: 4 MMOL/L (ref 3.5–5.1)
Q-T INTERVAL: 400 MS
QRS DURATION: 86 MS
QTC CALCULATION (BEZET): 516 MS
R AXIS: 89 DEGREES
RBC # BLD AUTO: 2.71 X10(6)UL (ref 3.8–5.3)
ROULEAUX BLD QL SMEAR: PRESENT
SODIUM SERPL-SCNC: 141 MMOL/L (ref 136–145)
T AXIS: 164 DEGREES
TIDAL VOLUME: 325 ML
TOTAL HEMOGLOBIN: 9 G/DL (ref 11.7–16)
VALPROATE SERPL-MCNC: 39.2 UG/ML (ref 50–100)
VENT RATE: 12 /MIN
VENTRICULAR RATE: 100 BPM
WBC # BLD AUTO: 21.9 X10(3) UL (ref 4–11)

## 2019-02-17 PROCEDURE — 71045 X-RAY EXAM CHEST 1 VIEW: CPT | Performed by: INTERNAL MEDICINE

## 2019-02-17 PROCEDURE — 49422 REMOVE TUNNELED IP CATH: CPT | Performed by: SURGERY

## 2019-02-17 PROCEDURE — 99232 SBSQ HOSP IP/OBS MODERATE 35: CPT | Performed by: HOSPITALIST

## 2019-02-17 PROCEDURE — 99223 1ST HOSP IP/OBS HIGH 75: CPT | Performed by: SURGERY

## 2019-02-17 PROCEDURE — 0WPG03Z REMOVAL OF INFUSION DEVICE FROM PERITONEAL CAVITY, OPEN APPROACH: ICD-10-PCS | Performed by: SURGERY

## 2019-02-17 PROCEDURE — 70450 CT HEAD/BRAIN W/O DYE: CPT | Performed by: OTHER

## 2019-02-17 PROCEDURE — 99233 SBSQ HOSP IP/OBS HIGH 50: CPT | Performed by: INTERNAL MEDICINE

## 2019-02-17 PROCEDURE — 99291 CRITICAL CARE FIRST HOUR: CPT | Performed by: OTHER

## 2019-02-17 RX ORDER — CEFOXITIN 2 G/1
INJECTION, POWDER, FOR SOLUTION INTRAVENOUS
Status: DISCONTINUED | OUTPATIENT
Start: 2019-02-17 | End: 2019-02-18 | Stop reason: HOSPADM

## 2019-02-17 RX ORDER — DEXTROSE AND SODIUM CHLORIDE 5; .9 G/100ML; G/100ML
INJECTION, SOLUTION INTRAVENOUS CONTINUOUS
Status: DISCONTINUED | OUTPATIENT
Start: 2019-02-17 | End: 2019-02-21

## 2019-02-17 RX ORDER — LIDOCAINE HYDROCHLORIDE AND EPINEPHRINE 10; 10 MG/ML; UG/ML
INJECTION, SOLUTION INFILTRATION; PERINEURAL AS NEEDED
Status: DISCONTINUED | OUTPATIENT
Start: 2019-02-17 | End: 2019-02-18 | Stop reason: HOSPADM

## 2019-02-17 RX ORDER — HEPARIN SODIUM 5000 [USP'U]/ML
5000 INJECTION, SOLUTION INTRAVENOUS; SUBCUTANEOUS ONCE
Status: COMPLETED | OUTPATIENT
Start: 2019-02-17 | End: 2019-02-17

## 2019-02-17 NOTE — PROGRESS NOTES
[facility]  Progress Note     Nohemi Able Patient Status:  Inpatient    1965 MRN [id number]   [facility] 4SW-A Attending Albina Rhodes MD   Hosp Day # 3 PCP Alicia Antonio MD     Chief Complaint: new onset seizures    S: Patient 102  105  107   CO2  27.0   < >  24.0  27.0  26.0  26.0   ALKPHO  507*   --   310*   --    --   305*   AST  16   --   12*   --    --   11*   ALT  11*   --   11*   --    --   13   BILT  0.4   --   0.4   --    --   0.4   TP  6.5   --   6.2*   --    --   6.8 MD

## 2019-02-17 NOTE — PLAN OF CARE
NEUROLOGICAL - ADULT    • Achieves maximal functionality and self care Not Progressing        RESPIRATORY - ADULT    • Achieves optimal ventilation and oxygenation Not Progressing        SKIN/TISSUE INTEGRITY - ADULT    • Skin integrity remains intact Not

## 2019-02-17 NOTE — CONSULTS
[facility]  Report of Consultation    Alden Lovett Patient Status:  Inpatient    1965 MRN [id number]   [facility] 4SW-A Attending Radha Peng MD   Hosp Day # 3 PCP Darren Chowdary MD     Reason for Consultation:  Infected PD cathet • Diabetes [facility])    • Esophageal reflux    • Essential hypertension    • Osteomyelitis ([facility] [address]    • Stroke [facility])    • Thyroid disease      History reviewed. No pertinent surgical history. No family history on file. reports that  has never smoked.  she reeves PRN **OR** fentaNYL citrate (SUBLIMAZE) 0.05 MG/ML injection 50 mcg, 50 mcg, Intravenous, Q30 Min PRN  •  acetaminophen (TYLENOL) tab 650 mg, 650 mg, Oral, Q6H PRN **OR** acetaminophen (TYLENOL) 160 MG/5ML oral liquid 650 mg, 650 mg, Oral, Q6H PRN **OR** a scleral icterus. Mucous membranes are moist. EOM are WNL. Neck: No tenderness to palpitation. No JVD. Supple. Lungs: Mechanical ventilation audible    Cardiac: Regular rate and rhythm. No murmur.     Abdomen: Soft, no apparent tenderness to palpatio 7. 9*  8.1*  7.8*  7.8*   ALB  1.2*   --   1.1*   --    --   1.5*   NA  133*   < >  135*  137  139  141   K  3.2*   < >  3.1*  3.3*  3.3*  4.0   CL  97*   < >  101  102  105  107   CO2  27.0   < >  24.0  27.0  26.0  26.0   ALKPHO  507*   --   310*   --    - patient:  1 Hour 30 Minutes    Edouard Stager  2/17/2019  10:39 AM    Addendum:    I have seen and examined the patient; I obtained and performed the above history, physical examination, assessment and plan.   I have edited the above to reflect my evaluati

## 2019-02-17 NOTE — PLAN OF CARE
METABOLIC/FLUID AND ELECTROLYTES - ADULT    • Hemodynamic stability and optimal renal function maintained Not Progressing        NEUROLOGICAL - ADULT    • Achieves stable or improved neurological status Not Progressing    • Absence of seizures Not Progress

## 2019-02-17 NOTE — PROGRESS NOTES
[facility]  Progress Note    Karlie Layton Patient Status:  Inpatient    1965 MRN [id number]   [facility] 4SW-A Attending Adis Cabrera MD   Hosp Day # 3 PCP Gopal Kovacs MD     Subjective:  Karlie Layton is a(n) 52year old female. 02/16/19   1300  02/17/19   0414   RBC  2.22*  2.18*  2.06*  2.00*   --   2.71*   HGB  7.4*  7.2*  7.1*  6.9*  8.0*  8.6*   HCT  24.7*  24.2*  22.8*  22.1*   --   28.3*   MCV  111.3*  111.0*  110.7*  110.5*   --   104.4*   MCH  33.3  33.0  34.5*  34.5*   - reviewed. ASSESSMENT     #acute encephalopathy - likely due to seizures/ status myoclonus, possible UTI/sepsis. Significantly abnormal EEG noted.   The patient is not waking up and a repeat CT scan of her brain is pending.     #Possible seizure Gonzalez Stewart

## 2019-02-17 NOTE — PROGRESS NOTES
INPATIENT NEUROLOGY PROGRESS NOTE    Date: 2/17/2019    Clemencia Da Silva is a 52year old female at Hospital Day ( LOS: 3 days )    Assessment:   Status epilepticus: EEG showed PLEDs but gradually improved while  on depakote and keppra, received versed, propofol Nebulization Q4H PRN   Clopidogrel Bisulfate (PLAVIX) tab 75 mg 75 mg Oral Daily   Levothyroxine Sodium (SYNTHROID, LEVOTHROID) tab 125 mcg 125 mcg Oral Before breakfast   Heparin Sodium (Porcine) 5000 UNIT/ML injection 5,000 Units 5,000 Units Subcutaneous (PITRESSIN) 20 Units in sodium chloride 0.9% 50 mL infusion for shock 0.04 Units/min Intravenous Continuous PRN        Objective:   Physical Exam:  /63   Pulse 98   Temp 97.8 °F (36.6 °C) (Temporal)   Resp 14   Ht 63\"   Wt 137 lb 2 oz   SpO2 99%   B

## 2019-02-17 NOTE — PROGRESS NOTES
02/16/19 2103   Vent Information   $ RT Standby Charge (per 15 min) 1  (vent check)   Ventilator Initiation 02/14/19   Ventilation Day(s) 3   Interface Invasive   Vent Type PB   Vent ID 4   Vent Mode VC+   Settings   FiO2 (%) 30 %   Resp Rate (Set) 12

## 2019-02-18 ENCOUNTER — APPOINTMENT (OUTPATIENT)
Dept: GENERAL RADIOLOGY | Facility: HOSPITAL | Age: 54
DRG: 003 | End: 2019-02-18
Attending: SURGERY
Payer: MEDICARE

## 2019-02-18 LAB
ALBUMIN SERPL-MCNC: 1.4 G/DL (ref 3.4–5)
ALBUMIN/GLOB SERPL: 0.3 {RATIO} (ref 1–2)
ALP LIVER SERPL-CCNC: 357 U/L (ref 41–108)
ALT SERPL-CCNC: 14 U/L (ref 13–56)
ANION GAP SERPL CALC-SCNC: 11 MMOL/L (ref 0–18)
ARTERIAL BLD GAS O2 SATURATION: 94 % (ref 92–100)
ARTERIAL BLOOD GAS BASE EXCESS: -1.1
ARTERIAL BLOOD GAS HCO3: 23.5 MEQ/L (ref 22–26)
ARTERIAL BLOOD GAS PCO2: 37 MM HG (ref 35–45)
ARTERIAL BLOOD GAS PH: 7.42 (ref 7.35–7.45)
ARTERIAL BLOOD GAS PO2: 67 MM HG (ref 80–105)
AST SERPL-CCNC: 13 U/L (ref 15–37)
BASOPHILS # BLD AUTO: 0.03 X10(3) UL (ref 0–0.2)
BASOPHILS NFR BLD AUTO: 0.2 %
BILIRUB SERPL-MCNC: 0.5 MG/DL (ref 0.1–2)
BUN BLD-MCNC: 29 MG/DL (ref 7–18)
BUN/CREAT SERPL: 9.5 (ref 10–20)
CALCIUM BLD-MCNC: 8.1 MG/DL (ref 8.5–10.1)
CALCULATED O2 SATURATION: 94 % (ref 92–100)
CARBOXYHEMOGLOBIN: 2 % SAT (ref 0–3)
CHLORIDE SERPL-SCNC: 108 MMOL/L (ref 98–107)
CO2 SERPL-SCNC: 23 MMOL/L (ref 21–32)
CREAT BLD-MCNC: 3.05 MG/DL (ref 0.55–1.02)
DEPRECATED RDW RBC AUTO: 97.3 FL (ref 35.1–46.3)
EOSINOPHIL # BLD AUTO: 0.02 X10(3) UL (ref 0–0.7)
EOSINOPHIL NFR BLD AUTO: 0.1 %
ERYTHROCYTE [DISTWIDTH] IN BLOOD BY AUTOMATED COUNT: 25.2 % (ref 11–15)
FIO2: 30 %
GLOBULIN PLAS-MCNC: 5.2 G/DL (ref 2.8–4.4)
GLUCOSE BLD-MCNC: 146 MG/DL (ref 70–99)
GLUCOSE BLD-MCNC: 87 MG/DL (ref 70–99)
GLUCOSE BLD-MCNC: 94 MG/DL (ref 70–99)
GLUCOSE BLD-MCNC: 95 MG/DL (ref 70–99)
HAV IGM SER QL: 2.4 MG/DL (ref 1.6–2.6)
HCT VFR BLD AUTO: 28.4 % (ref 35–48)
HGB BLD-MCNC: 8.6 G/DL (ref 12–16)
IMM GRANULOCYTES # BLD AUTO: 0.21 X10(3) UL (ref 0–1)
IMM GRANULOCYTES NFR BLD: 1.1 %
LYMPHOCYTES # BLD AUTO: 0.57 X10(3) UL (ref 1–4)
LYMPHOCYTES NFR BLD AUTO: 2.9 %
M PROTEIN MFR SERPL ELPH: 6.6 G/DL (ref 6.4–8.2)
MCH RBC QN AUTO: 32.1 PG (ref 26–34)
MCHC RBC AUTO-ENTMCNC: 30.3 G/DL (ref 31–37)
MCV RBC AUTO: 106 FL (ref 80–100)
METHEMOGLOBIN: 0.6 % SAT (ref 0.4–1.5)
MONOCYTES # BLD AUTO: 1.24 X10(3) UL (ref 0.1–1)
MONOCYTES NFR BLD AUTO: 6.3 %
NEUTROPHILS # BLD AUTO: 17.66 X10 (3) UL (ref 1.5–7.7)
NEUTROPHILS # BLD AUTO: 17.66 X10(3) UL (ref 1.5–7.7)
NEUTROPHILS NFR BLD AUTO: 89.4 %
OSMOLALITY SERPL CALC.SUM OF ELEC: 302 MOSM/KG (ref 275–295)
PATIENT TEMPERATURE: 97 F
PEEP: 5 CM H2O
PHOSPHATE SERPL-MCNC: 4.9 MG/DL (ref 2.5–4.9)
PLATELET # BLD AUTO: 665 10(3)UL (ref 150–450)
POTASSIUM SERPL-SCNC: 3.7 MMOL/L (ref 3.5–5.1)
RBC # BLD AUTO: 2.68 X10(6)UL (ref 3.8–5.3)
SODIUM SERPL-SCNC: 142 MMOL/L (ref 136–145)
TIDAL VOLUME: 325 ML
TOTAL HEMOGLOBIN: 9.4 G/DL (ref 11.7–16)
VALPROATE SERPL-MCNC: 28.9 UG/ML (ref 50–100)
VENT RATE: 12 /MIN
WBC # BLD AUTO: 19.7 X10(3) UL (ref 4–11)

## 2019-02-18 PROCEDURE — 95819 EEG AWAKE AND ASLEEP: CPT | Performed by: OTHER

## 2019-02-18 PROCEDURE — 99233 SBSQ HOSP IP/OBS HIGH 50: CPT | Performed by: OTHER

## 2019-02-18 PROCEDURE — 99233 SBSQ HOSP IP/OBS HIGH 50: CPT | Performed by: STUDENT IN AN ORGANIZED HEALTH CARE EDUCATION/TRAINING PROGRAM

## 2019-02-18 PROCEDURE — 87158 CULTURE TYPING ADDED METHOD: CPT

## 2019-02-18 PROCEDURE — 87186 SC STD MICRODIL/AGAR DIL: CPT

## 2019-02-18 PROCEDURE — 71045 X-RAY EXAM CHEST 1 VIEW: CPT | Performed by: SURGERY

## 2019-02-18 PROCEDURE — 99233 SBSQ HOSP IP/OBS HIGH 50: CPT | Performed by: INTERNAL MEDICINE

## 2019-02-18 RX ORDER — ASCORBIC ACID, THIAMINE, RIBOFLAVIN, NIACINAMIDE, PYRIDOXINE, FOLIC ACID, COBALAMIN, BIOTIN, PANTOTHENIC ACID 100; 1.5; 1.7; 20; 10; 1; 6; 300; 1 MG/1; MG/1; MG/1; MG/1; MG/1; MG/1; UG/1; UG/1; MG/1
1 TABLET, COATED ORAL DAILY
Status: DISCONTINUED | OUTPATIENT
Start: 2019-02-18 | End: 2019-02-26

## 2019-02-18 RX ORDER — ACETYLCYSTEINE 200 MG/ML
2 SOLUTION ORAL; RESPIRATORY (INHALATION) EVERY 4 HOURS
Status: DISCONTINUED | OUTPATIENT
Start: 2019-02-18 | End: 2019-02-25

## 2019-02-18 RX ORDER — HEPARIN SODIUM 1000 [USP'U]/ML
1000 INJECTION, SOLUTION INTRAVENOUS; SUBCUTANEOUS ONCE
Status: COMPLETED | OUTPATIENT
Start: 2019-02-18 | End: 2019-02-18

## 2019-02-18 RX ORDER — ALBUMIN (HUMAN) 12.5 G/50ML
25 SOLUTION INTRAVENOUS ONCE
Status: DISCONTINUED | OUTPATIENT
Start: 2019-02-18 | End: 2019-02-25

## 2019-02-18 NOTE — BRIEF OP NOTE
Pre-Operative Diagnosis: Peritonitis due to infected peritoneal dialysis catheter ([address] [T85.71XA, K65.9]     Post-Operative Diagnosis: Peritonitis due to infected peritoneal dialysis catheter ([facility] [address] Jaylene Decker, K65.9]      Procedure Performed:   Procedure(s

## 2019-02-18 NOTE — PLAN OF CARE
I called patient's relative Lashell Palomares and son Huang today. I updated them on cultures, EEG, current medical problems. They report they are going to try to come in at 2pm tomorrow if able. Son LaMoure Quaker I was unable to reach and mailbox was full.   He reportedly

## 2019-02-18 NOTE — PROGRESS NOTES
[facility]  Nephrology Progress Note    Karlie Layton Attending:  Adis Cabrera MD       Assessment and Plan:    1) ESRD- due to diabetes; PD cath removed due to Candida Glabrata in peritoneal fluid cx.  To continue hemodialysis for the forseeable future- 02/18/2019     02/18/2019    CA 8.1 02/18/2019    ALB 1.4 02/18/2019    ALKPHO 357 02/18/2019    BILT 0.5 02/18/2019    TP 6.6 02/18/2019    AST 13 02/18/2019    ALT 14 02/18/2019    MG 2.4 02/18/2019    PHOS 4.9 02/18/2019    PGLU 94 02/18/2019 oral liquid 650 mg 650 mg Oral Q6H PRN   Or      acetaminophen (TYLENOL) 650 MG rectal suppository 650 mg 650 mg Rectal Q6H PRN   fentaNYL citrate (SUBLIMAZE) 1,000 mcg in sodium chloride 0.9% 100 mL infusion  mcg/hr Intravenous Continuous PRN   PEG

## 2019-02-18 NOTE — PROGRESS NOTES
[facility]    Progress Note    Wilman Bob Patient Status:  Inpatient    1965 MRN [id number]   [facility] 4SW-A Attending Tashia Wahl MD   Hosp Day # 4 PCP Leo Pedersen MD     Subjective:  Wilman Bob is a(n) 52year old fem Valproate Sodium (DEPACON) 500 mg in sodium chloride 0.9% 100 mL IVPB 500 mg Intravenous Q8H   Midazolam HCl (VERSED) 250 mg in sodium chloride 0.9% 250 mL infusion 0.05-2 mg/kg/hr (Dosing Weight) Intravenous Continuous   ipratropium-albuterol (DUONEB) n (DEX-4) 4-6 GM-MG chewable tab 8 tablet 8 tablet Oral Q15 Min PRN   norepinephrine (LEVOPHED) 4 mg/250 ml premix infusion 0.5-30 mcg/min Intravenous Continuous PRN   vasopressin (PITRESSIN) 20 Units in sodium chloride 0.9% 50 mL infusion for shock 0.04 Uni

## 2019-02-18 NOTE — PROGRESS NOTES
[facility]                INFECTIOUS DISEASE PROGRESS NOTE    Gonzales Zheng Patient Status:  Inpatient    1965 MRN [id number]   [facility] 4SW-A Attending Prerna Anna MD   Hosp Day # 4 PCP Elsa Canseco MD     Antibiotics:Ranulfo [[id number]] Collected: 02/14/19 1410   Specimen:  Body fluid, unspecified Updated: 02/14/19 1535    Neutrophils Peritoneal 21 %     Lymphocyte Peritoneal 37 %     Mono/Mac/Histio Peritoneal 42 %     Eosinophils Peritoneal 0 %     Basophil Peritoneal 0 % interpretation is currently available. Ceftazidime/avibactam* >16        * This antibiotic result is an MICHAEL. No interpretation is currently available.     Pseudomonas aeruginosa -  (no method available)     Cefepime >16 Resistant      Ceftazidime >16 Re failure/vent per pulm    5.  Wounds, continue local care, cx taken cw surface clark    Oralia Paul MD  [facility]  [phone]

## 2019-02-18 NOTE — PROGRESS NOTES
[facility]  Progress Note    Lee Goldsmith Patient Status:  Inpatient    1965 MRN [id number]   [facility] 4SW-A Attending Yogesh Acosta MD   Hosp Day # 4 PCP Jose Alberto Saxena MD     Subjective:  Pt intubated, non-responsive, no famil injury stage     Sepsis ([facility] [address]     Seizure ([facility] [address]     Status epilepticus ([facility] [address]     Myoclonus     Peritonitis due to infected peritoneal dialysis catheter ([facility] [address]      POD 1 removal of peritoneal dialysis catheter secondary to fungal growth   ESRD   Seizure dis

## 2019-02-18 NOTE — PROGRESS NOTES
[facility]  Progress Note    Antonietta Barker Patient Status:  Inpatient    1965 MRN [id number]   [facility] 4SW-A Attending Mustapha Saxena MD   Hosp Day # 4 PCP Baldemar Kessler MD     Subjective:  Antonietta Barker is a(n) 52year old female. WBC  18.7*   < >  23.0*   --   21.9*  19.7*   PLT  867.0*   < >  733.0*   --   684.0*  665.0*    < > = values in this interval not displayed.      Recent Labs   Lab  02/16/19   0454  02/17/19   0414  02/18/19   0455   GLU  200*  201*  146*   BUN  20*  26* measures  Reintroduced trickle tube feedings  Prophylaxis: heparin sq, PPI        Melissa Judge Lesvia NUNEZ.  Crit care 35  2/18/2019  9:45 AM

## 2019-02-18 NOTE — PLAN OF CARE
Diabetes/Glucose Control    • Glucose maintained within prescribed range Not Progressing        NEUROLOGICAL - ADULT    • Achieves stable or improved neurological status Not Progressing    • Remains free of injury related to seizure activity Not Progressin

## 2019-02-18 NOTE — OPERATIVE REPORT
[facility]    PATIENT'S NAME: Jevon Arellano LILA   ATTENDING PHYSICIAN: Chantal Clark. Harriette Gitelman, MD   OPERATING PHYSICIAN: Antoinette Miller M.D.    PATIENT ACCOUNT#:   [de-identified]    LOCATION:  [address] [place] [address]  MEDICAL RECORD #:   [id number]       DATE OF BIRTH:  [birth year] perioperative morbidity due to underlying medical conditions. The patient's family voiced understanding. All questions were answered, and the family provided willing and informed consent to proceed.     OPERATIVE TECHNIQUE:  The patient was brought to injected. Sterile dressings are applied. The patient is then kept intubated and sedated and brought by the anesthesiologist to the intensive care unit in stable condition.   Operative findings were discussed via telephone with the patient's family represe

## 2019-02-18 NOTE — PROGRESS NOTES
[facility]  Progress Note     Hipolito Fuelling Patient Status:  Inpatient    1965 MRN [id number]   [facility] 4SW-A Attending Judie Hawley MD   Hosp Day # 4 PCP Rosibel Keane MD     Chief Complaint: intubated unable to obtain 0.4  0.5   TP  6.2*   --    --   6.8  6.6    < > = values in this interval not displayed. Estimated Creatinine Clearance: 17.2 mL/min (A) (based on SCr of 3.05 mg/dL (H)).     Recent Labs   Lab  02/14/19   0201  02/15/19   0426   PTP  12.8  13.9   I POA  1. Wound care, picutres on chart reviewed     Quality:  · DVT ppx: heparin  · Code status: full    Case d/w RN, surgery MD rachel.  May be beneficial to arrange care conference. F/u on EEG, appreciate consultants.      Jonah MARVIN  11:54 AM     Pt

## 2019-02-18 NOTE — PROCEDURES
ELECTROENCEPHALOGRAM REPORT      Patient Name: Clemencia Da Silva   : 1965   Requesting Physician: Dr. Kandace Arndt   Date of Test: 2019   History: 52year old female that presented for altered mental status and lethargy, and was noted to have twitching.

## 2019-02-18 NOTE — DIETARY NOTE
NUTRITION Follow up  ASSESSMENT    Pt is at moderate nutrition risk. Pt does not meet malnutrition criteria.     NUTRITION DIAGNOSIS/PROBLEM:    Inadequate oral intake related to inability to consume sufficient energy as evidenced by the need for g tube fee Yes    NUTRITION RELATED PHYSICAL FINDINGS:     1. Body Fat/Muscle Mass: BMI- 25.19     2.  Fluid Accumulation: none noted    NUTRITION PRESCRIPTION:  Calories: 0018-9631 calories/day (25-30 calories per kg)  Protein:  grams protein/day (1.3-1.8 grams

## 2019-02-19 ENCOUNTER — APPOINTMENT (OUTPATIENT)
Dept: GENERAL RADIOLOGY | Facility: HOSPITAL | Age: 54
DRG: 003 | End: 2019-02-19
Attending: SURGERY
Payer: MEDICARE

## 2019-02-19 LAB
ALBUMIN SERPL-MCNC: 1.3 G/DL (ref 3.4–5)
ALBUMIN/GLOB SERPL: 0.3 {RATIO} (ref 1–2)
ALLENS TEST: POSITIVE
ALP LIVER SERPL-CCNC: 483 U/L (ref 41–108)
ALT SERPL-CCNC: 13 U/L (ref 13–56)
ANION GAP SERPL CALC-SCNC: 9 MMOL/L (ref 0–18)
ARTERIAL BLD GAS O2 SATURATION: 96 % (ref 92–100)
ARTERIAL BLOOD GAS BASE EXCESS: -0.6
ARTERIAL BLOOD GAS HCO3: 23.7 MEQ/L (ref 22–26)
ARTERIAL BLOOD GAS PCO2: 38 MM HG (ref 35–45)
ARTERIAL BLOOD GAS PH: 7.42 (ref 7.35–7.45)
ARTERIAL BLOOD GAS PO2: 122 MM HG (ref 80–105)
AST SERPL-CCNC: 15 U/L (ref 15–37)
BASOPHILS # BLD AUTO: 0.03 X10(3) UL (ref 0–0.2)
BASOPHILS NFR BLD AUTO: 0.1 %
BILIRUB SERPL-MCNC: 0.5 MG/DL (ref 0.1–2)
BUN BLD-MCNC: 12 MG/DL (ref 7–18)
BUN/CREAT SERPL: 5.9 (ref 10–20)
CALCIUM BLD-MCNC: 7.9 MG/DL (ref 8.5–10.1)
CALCULATED O2 SATURATION: 99 % (ref 92–100)
CARBOXYHEMOGLOBIN: 1.7 % SAT (ref 0–3)
CHLORIDE SERPL-SCNC: 104 MMOL/L (ref 98–107)
CO2 SERPL-SCNC: 26 MMOL/L (ref 21–32)
CREAT BLD-MCNC: 2.03 MG/DL (ref 0.55–1.02)
DEPRECATED RDW RBC AUTO: 92.8 FL (ref 35.1–46.3)
EOSINOPHIL # BLD AUTO: 0.03 X10(3) UL (ref 0–0.7)
EOSINOPHIL NFR BLD AUTO: 0.1 %
ERYTHROCYTE [DISTWIDTH] IN BLOOD BY AUTOMATED COUNT: 24.5 % (ref 11–15)
FIO2: 30 %
GLOBULIN PLAS-MCNC: 4.9 G/DL (ref 2.8–4.4)
GLUCOSE BLD-MCNC: 116 MG/DL (ref 70–99)
GLUCOSE BLD-MCNC: 149 MG/DL (ref 70–99)
GLUCOSE BLD-MCNC: 185 MG/DL (ref 70–99)
GLUCOSE BLD-MCNC: 82 MG/DL (ref 70–99)
GLUCOSE BLD-MCNC: 84 MG/DL (ref 70–99)
GLUCOSE BLD-MCNC: 90 MG/DL (ref 70–99)
HAV IGM SER QL: 2 MG/DL (ref 1.6–2.6)
HCT VFR BLD AUTO: 28 % (ref 35–48)
HGB BLD-MCNC: 8.7 G/DL (ref 12–16)
IMM GRANULOCYTES # BLD AUTO: 0.22 X10(3) UL (ref 0–1)
IMM GRANULOCYTES NFR BLD: 0.9 %
LYMPHOCYTES # BLD AUTO: 0.74 X10(3) UL (ref 1–4)
LYMPHOCYTES NFR BLD AUTO: 3 %
M PROTEIN MFR SERPL ELPH: 6.2 G/DL (ref 6.4–8.2)
MCH RBC QN AUTO: 32.2 PG (ref 26–34)
MCHC RBC AUTO-ENTMCNC: 31.1 G/DL (ref 31–37)
MCV RBC AUTO: 103.7 FL (ref 80–100)
METHEMOGLOBIN: 0.6 % SAT (ref 0.4–1.5)
MONOCYTES # BLD AUTO: 1.24 X10(3) UL (ref 0.1–1)
MONOCYTES NFR BLD AUTO: 5 %
NEUTROPHILS # BLD AUTO: 22.76 X10 (3) UL (ref 1.5–7.7)
NEUTROPHILS # BLD AUTO: 22.76 X10(3) UL (ref 1.5–7.7)
NEUTROPHILS NFR BLD AUTO: 90.9 %
OSMOLALITY SERPL CALC.SUM OF ELEC: 287 MOSM/KG (ref 275–295)
PATIENT TEMPERATURE: 98.8 F
PEEP: 5 CM H2O
PHOSPHATE SERPL-MCNC: 2.5 MG/DL (ref 2.5–4.9)
PLATELET # BLD AUTO: 575 10(3)UL (ref 150–450)
PLATELET MORPHOLOGY: NORMAL
POTASSIUM SERPL-SCNC: 3.1 MMOL/L (ref 3.5–5.1)
RBC # BLD AUTO: 2.7 X10(6)UL (ref 3.8–5.3)
SODIUM SERPL-SCNC: 139 MMOL/L (ref 136–145)
TIDAL VOLUME: 325 ML
TOTAL HEMOGLOBIN: 9 G/DL (ref 11.7–16)
TRIGL SERPL-MCNC: 371 MG/DL (ref 30–149)
VENT RATE: 12 /MIN
WBC # BLD AUTO: 25 X10(3) UL (ref 4–11)

## 2019-02-19 PROCEDURE — 99232 SBSQ HOSP IP/OBS MODERATE 35: CPT | Performed by: STUDENT IN AN ORGANIZED HEALTH CARE EDUCATION/TRAINING PROGRAM

## 2019-02-19 PROCEDURE — 71045 X-RAY EXAM CHEST 1 VIEW: CPT | Performed by: SURGERY

## 2019-02-19 PROCEDURE — 99233 SBSQ HOSP IP/OBS HIGH 50: CPT | Performed by: OTHER

## 2019-02-19 PROCEDURE — 99233 SBSQ HOSP IP/OBS HIGH 50: CPT | Performed by: INTERNAL MEDICINE

## 2019-02-19 RX ORDER — POTASSIUM CHLORIDE 20 MEQ/1
40 TABLET, EXTENDED RELEASE ORAL ONCE
Status: COMPLETED | OUTPATIENT
Start: 2019-02-19 | End: 2019-02-19

## 2019-02-19 RX ORDER — IPRATROPIUM BROMIDE AND ALBUTEROL SULFATE 2.5; .5 MG/3ML; MG/3ML
3 SOLUTION RESPIRATORY (INHALATION)
Status: DISCONTINUED | OUTPATIENT
Start: 2019-02-19 | End: 2019-02-26

## 2019-02-19 NOTE — PLAN OF CARE
SKIN/TISSUE INTEGRITY - ADULT    • Skin integrity remains intact Not Progressing    • Incision(s), wounds(s) or drain site(s) healing without S/S of infection Not Progressing          NEUROLOGICAL - ADULT    • Achieves stable or improved neurological statu

## 2019-02-19 NOTE — PROGRESS NOTES
[facility]  Progress Note     Angela Ponce Patient Status:  Inpatient    1965 MRN [id number]   [facility] 4SW-A Attending Harvey Laguna MD   Hosp Day # 5 PCP Yadiel Romero MD     Chief Complaint: intubated unable to obtain 483*   AST  11*  13*  15   ALT  13  14  13   BILT  0.4  0.5  0.5   TP  6.8  6.6  6.2*       Estimated Creatinine Clearance: 25.9 mL/min (A) (based on SCr of 2.03 mg/dL (H)).     Recent Labs   Lab  02/14/19   0201  02/15/19   0426   PTP  12.8  13.9   INR  0. stump, R ankle pressure sores POA  1. Wound care, picutres on chart reviewed     Quality:  · DVT ppx: heparin  · Code status: full    Case d/w RN, Dr. Radha Lind and Dr. Viviana Bustamante.    Family hopefully coming in at 2pm and I will discuss overall medical issues wi

## 2019-02-19 NOTE — PROGRESS NOTES
[facility]                INFECTIOUS DISEASE PROGRESS NOTE    Delma Russell Patient Status:  Inpatient    1965 MRN [id number]   [facility] 4SW-A Attending Imelda Gabriel MD   Hosp Day # 5 PCP Billy Amos MD     Antibiotics:Ranulfo Total Cells Counted 100   Cell Count, peritoneal fluid Once [[id number]] Collected: 02/14/19 1410   Specimen:  Body fluid, unspecified Updated: 02/14/19 1521    WBC Peritoneal 299 /mm3 PMN:86    RBC Peritoneal <3,000 /mm3        Hospital Encounter on 02/1 Resistant      Ciprofloxacin >2 Resistant      Gentamicin 4 Sensitive      Meropenem* >8 Resistant       * Multiple drug resistant organism (MDRO)     Levofloxacin >4 Resistant      Piperacillin + Tazobactam >=64 Resistant      Colistin <=2 Sensitive

## 2019-02-19 NOTE — PLAN OF CARE
NEUROLOGICAL - ADULT    • Achieves stable or improved neurological status Not Progressing    • Achieves maximal functionality and self care Not Progressing          Diabetes/Glucose Control    • Glucose maintained within prescribed range Progressing

## 2019-02-19 NOTE — PROGRESS NOTES
We had family meeting today. Neurology, hospitalist team present. We discussed current medical conditions, neurological findings, CODE STATUS. Family would like to take the time to discuss further amongst each other.   They were agreeable to reconvene on

## 2019-02-19 NOTE — PROGRESS NOTES
[facility]    Progress Note    Nohemi Reilly Patient Status:  Inpatient    1965 MRN [id number]   [facility] 4SW-A Attending Deven Andres MD   Hosp Day # 5 PCP Alicia Antonio MD     Subjective:  Nohemi Reilly is a(n) 52year old fem 750 mg in sodium chloride 0.9% 100 mL IVPB 750 mg Intravenous Q12H   propofol (DIPRIVAN) infusion 5-100 mcg/kg/min (Dosing Weight) Intravenous Continuous   Dakins (1/2 strength) (Sodium hypochlorite (1/2 strength)) 0.2-0.25 % external solution  Topical BID 50 mL 50 mL Intravenous Q15 Min PRN   Or      glucose (DEX4) oral liquid 30 g 30 g Oral Q15 Min PRN   Or      Glucose-Vitamin C (DEX-4) 4-6 GM-MG chewable tab 8 tablet 8 tablet Oral Q15 Min PRN   norepinephrine (LEVOPHED) 4 mg/250 ml premix infusion 0.5-30 phase of a myoclonic status epilepticus  Continue Depakote 500mg q 8, ok to change to po  Continue Keppra 750mg q 12, ok to change to po  Discussed with family today in meeting- from neurologic standpoint, difficult to say how much cognitive function will

## 2019-02-19 NOTE — PROGRESS NOTES
[facility]  Progress Note    Sage Bentley Patient Status:  Inpatient    1965 MRN [id number]   [facility] 4SW-A Attending Shona Lowe MD   Hosp Day # 5 PCP Chano Nichole MD     STATUS UPDATE: Neurology has reviewed all available no guarding, no   rebound, positive BS   Extremity: Trace peripheral edema, no cyanosis   Neurological: Not awake or alert     Lab Data Review:  Recent Labs   Lab  02/17/19   0414  02/18/19   0455  02/19/19   0521   RBC  2.71*  2.68*  2.70*   HGB  8.6*  8. intra-abdominal catheter.     #History of CVA/G-tube     #History of PAF on subcu heparin and Plavix apparently     #History of MI/ischemic cardiomyopathy last EF on record 9/17 of 40% with normal RV     #Receiving micafungin for yeast growing from her per

## 2019-02-19 NOTE — PROGRESS NOTES
[facility]  Nephrology Progress Note    Hipolito Fuelling Attending:  Juventino Berman MD       Assessment and Plan:    1) ESRD- due to diabetes; PD cath removed due to Candida Glabrata in peritoneal fluid cx.  Next HD Wed.      2) Candida glabrata peritonitis- o 02/19/2019    CA 7.9 02/19/2019    ALB 1.3 02/19/2019    ALKPHO 483 02/19/2019    BILT 0.5 02/19/2019    TP 6.2 02/19/2019    AST 15 02/19/2019    ALT 13 02/19/2019    MG 2.0 02/19/2019    PHOS 2.5 02/19/2019    PGLU 90 02/19/2019       Imaging:   All imagi fentaNYL citrate (SUBLIMAZE) 0.05 MG/ML injection 25 mcg 25 mcg Intravenous Q30 Min PRN   Or      fentaNYL citrate (SUBLIMAZE) 0.05 MG/ML injection 50 mcg 50 mcg Intravenous Q30 Min PRN   acetaminophen (TYLENOL) tab 650 mg 650 mg Oral Q6H PRN   Or      a

## 2019-02-19 NOTE — PROGRESS NOTES
[facility]  Progress Note    Karlie Layton Patient Status:  Inpatient    1965 MRN [id number]   [facility] 4SW-A Attending Elen Gonzales MD   Hosp Day # 5 PCP Jose Alberto Mott MD     Subjective:  Patient unresponsive, no family at bed Sepsis ([address]     Seizure ([address]     Status epilepticus ([address]     Myoclonus     Peritonitis due to infected peritoneal dialysis catheter ([address]      Infected peritoneal dialysis catheter, S/P surgical removal  Discussed patient with RN.   There is evidence th

## 2019-02-20 ENCOUNTER — APPOINTMENT (OUTPATIENT)
Dept: GENERAL RADIOLOGY | Facility: HOSPITAL | Age: 54
DRG: 003 | End: 2019-02-20
Attending: SURGERY
Payer: MEDICARE

## 2019-02-20 LAB
ANION GAP SERPL CALC-SCNC: 8 MMOL/L (ref 0–18)
ARTERIAL BLD GAS O2 SATURATION: 95 % (ref 92–100)
ARTERIAL BLOOD GAS BASE EXCESS: -2.6
ARTERIAL BLOOD GAS HCO3: 22 MEQ/L (ref 22–26)
ARTERIAL BLOOD GAS PCO2: 38 MM HG (ref 35–45)
ARTERIAL BLOOD GAS PH: 7.39 (ref 7.35–7.45)
ARTERIAL BLOOD GAS PO2: 100 MM HG (ref 80–105)
BASOPHILS # BLD AUTO: 0.03 X10(3) UL (ref 0–0.2)
BASOPHILS NFR BLD AUTO: 0.2 %
BUN BLD-MCNC: 16 MG/DL (ref 7–18)
BUN/CREAT SERPL: 6.5 (ref 10–20)
CALCIUM BLD-MCNC: 8.1 MG/DL (ref 8.5–10.1)
CALCULATED O2 SATURATION: 98 % (ref 92–100)
CARBOXYHEMOGLOBIN: 1.8 % SAT (ref 0–3)
CHLORIDE SERPL-SCNC: 110 MMOL/L (ref 98–107)
CO2 SERPL-SCNC: 24 MMOL/L (ref 21–32)
CREAT BLD-MCNC: 2.48 MG/DL (ref 0.55–1.02)
DEPRECATED RDW RBC AUTO: 91.5 FL (ref 35.1–46.3)
EOSINOPHIL # BLD AUTO: 0.11 X10(3) UL (ref 0–0.7)
EOSINOPHIL NFR BLD AUTO: 0.8 %
ERYTHROCYTE [DISTWIDTH] IN BLOOD BY AUTOMATED COUNT: 23.9 % (ref 11–15)
FIO2: 30 %
GLUCOSE BLD-MCNC: 133 MG/DL (ref 70–99)
GLUCOSE BLD-MCNC: 135 MG/DL (ref 70–99)
GLUCOSE BLD-MCNC: 159 MG/DL (ref 70–99)
GLUCOSE BLD-MCNC: 164 MG/DL (ref 70–99)
GLUCOSE BLD-MCNC: 200 MG/DL (ref 70–99)
HCT VFR BLD AUTO: 27.9 % (ref 35–48)
HGB BLD-MCNC: 8.5 G/DL (ref 12–16)
IMM GRANULOCYTES # BLD AUTO: 0.13 X10(3) UL (ref 0–1)
IMM GRANULOCYTES NFR BLD: 0.9 %
LYMPHOCYTES # BLD AUTO: 0.98 X10(3) UL (ref 1–4)
LYMPHOCYTES NFR BLD AUTO: 6.8 %
MCH RBC QN AUTO: 31.6 PG (ref 26–34)
MCHC RBC AUTO-ENTMCNC: 30.5 G/DL (ref 31–37)
MCV RBC AUTO: 103.7 FL (ref 80–100)
METHEMOGLOBIN: 0.7 % SAT (ref 0.4–1.5)
MONOCYTES # BLD AUTO: 1.03 X10(3) UL (ref 0.1–1)
MONOCYTES NFR BLD AUTO: 7.1 %
NEUTROPHILS # BLD AUTO: 12.19 X10 (3) UL (ref 1.5–7.7)
NEUTROPHILS # BLD AUTO: 12.19 X10(3) UL (ref 1.5–7.7)
NEUTROPHILS NFR BLD AUTO: 84.2 %
OSMOLALITY SERPL CALC.SUM OF ELEC: 297 MOSM/KG (ref 275–295)
PATIENT TEMPERATURE: 99 F
PEEP: 5 CM H2O
PLATELET # BLD AUTO: 510 10(3)UL (ref 150–450)
POTASSIUM SERPL-SCNC: 3.3 MMOL/L (ref 3.5–5.1)
RBC # BLD AUTO: 2.69 X10(6)UL (ref 3.8–5.3)
SODIUM SERPL-SCNC: 142 MMOL/L (ref 136–145)
TIDAL VOLUME: 325 ML
TOTAL HEMOGLOBIN: 8.5 G/DL (ref 11.7–16)
VENT RATE: 12 /MIN
WBC # BLD AUTO: 14.5 X10(3) UL (ref 4–11)

## 2019-02-20 PROCEDURE — 99232 SBSQ HOSP IP/OBS MODERATE 35: CPT | Performed by: PHYSICIAN ASSISTANT

## 2019-02-20 PROCEDURE — 99232 SBSQ HOSP IP/OBS MODERATE 35: CPT | Performed by: STUDENT IN AN ORGANIZED HEALTH CARE EDUCATION/TRAINING PROGRAM

## 2019-02-20 PROCEDURE — 99221 1ST HOSP IP/OBS SF/LOW 40: CPT | Performed by: INTERNAL MEDICINE

## 2019-02-20 PROCEDURE — 71045 X-RAY EXAM CHEST 1 VIEW: CPT | Performed by: SURGERY

## 2019-02-20 PROCEDURE — 99233 SBSQ HOSP IP/OBS HIGH 50: CPT | Performed by: INTERNAL MEDICINE

## 2019-02-20 PROCEDURE — 99233 SBSQ HOSP IP/OBS HIGH 50: CPT | Performed by: OTHER

## 2019-02-20 RX ORDER — HEPARIN SODIUM 1000 [USP'U]/ML
3300 INJECTION, SOLUTION INTRAVENOUS; SUBCUTANEOUS ONCE
Status: COMPLETED | OUTPATIENT
Start: 2019-02-20 | End: 2019-02-20

## 2019-02-20 RX ORDER — POTASSIUM CHLORIDE 20 MEQ/1
40 TABLET, EXTENDED RELEASE ORAL ONCE
Status: COMPLETED | OUTPATIENT
Start: 2019-02-20 | End: 2019-02-20

## 2019-02-20 RX ORDER — VALPROIC ACID 250 MG/5ML
750 SOLUTION ORAL EVERY 12 HOURS
Status: DISCONTINUED | OUTPATIENT
Start: 2019-02-20 | End: 2019-03-09

## 2019-02-20 NOTE — CONSULTS
[address] Initial Consult    Phyllis Gaming Patient Status:  Inpatient    1965 MRN [id number]   [facility] 4SW-A Attending Madison Montejo MD   Hosp Day # 6 PCP Patricia Valdez MD     Date of Consult: 2019  [facility] entered the patient's room, she was laying in a specialized bed, intubated on mechanical ventilation. Patient was seen and examined with no family at bedside. I was able to d/w Dr Leeanna Rock re: possible options in plans of care.  I also d/w'd Sudheer Whelan her determined    Advance Care Planning counseling and discussion:    TBD    Palliative Performance Scale:   %      Ambulation    Activity Level   Self-Care    Intake                          Consciosness  100   Full         Normal                      Full Activities: unknown  Ethnicity: listed as white in ADT sheet  Cultural Belief/Dietary Preference: unknown    Allergies:    Latex                       Comment:Added based on information entered during case             entry, please review and add reactions fentaNYL citrate (SUBLIMAZE) 0.05 MG/ML injection 25 mcg, 25 mcg, Intravenous, Q30 Min PRN **OR** fentaNYL citrate (SUBLIMAZE) 0.05 MG/ML injection 50 mcg, 50 mcg, Intravenous, Q30 Min PRN  •  acetaminophen (TYLENOL) tab 650 mg, 650 mg, Oral, Q6H PRN **OR* Date    CREATSERUM 2.48 (H) 02/20/2019    BUN 16 02/20/2019     02/20/2019    K 3.3 (L) 02/20/2019     (H) 02/20/2019    CO2 24.0 02/20/2019     (H) 02/20/2019    CA 8.1 (L) 02/20/2019    ALB 1.3 (L) 02/19/2019    ALKPHO 483 (H) 02/19/20 Warm and dry with flaky skin on right lower extremity, back/gluteal region with large wound reviewed on pictures in medical chart, metal plate punctured through skin on RLE, does not appear infected    Palliative Performance Scale : 20%

## 2019-02-20 NOTE — PROGRESS NOTES
[facility]  Progress Note     Darin Rizo Patient Status:  Inpatient    1965 MRN [id number]   [facility] 4SW-A Attending Lucio Ness MD   Hosp Day # 6 PCP Grace Hernandez MD     Chief Complaint: intubated unable to obtain 3.7  3.1*  3.3*   CL  107  108*  104  110*   CO2  26.0  23.0  26.0  24.0   ALKPHO  305*  357*  483*   --    AST  11*  13*  15   --    ALT  13  14  13   --    BILT  0.4  0.5  0.5   --    TP  6.8  6.6  6.2*   --        Estimated Creatinine Clearance: 21.2 mL removed. 10. Hypothyroidism  11. H/o CVA with left sided weakness   12. MDRO/XDRO  13. Sacral/buttock, LLE stump, R ankle pressure sores POA  1. Wound care, picutres on chart reviewed     Quality:  · DVT ppx: heparin  · Code status: full  · Estrada:   · . C

## 2019-02-20 NOTE — PROGRESS NOTES
[facility]                INFECTIOUS DISEASE PROGRESS NOTE    John Askew Patient Status:  Inpatient    1965 MRN [id number]   [facility] 4SW-A Attending Kirill Rios MD   Hosp Day # 6 PCP Julieth Lazar MD     Antibiotics:Ranulfo Peritoneal 37 %     Mono/Mac/Histio Peritoneal 42 %     Eosinophils Peritoneal 0 %     Basophil Peritoneal 0 %     Total Cells Counted 100   Cell Count, peritoneal fluid Once [[id number]] Collected: 02/14/19 1410   Specimen:  Body fluid, unspecified Updated: currently available.     Pseudomonas aeruginosa -  (no method available)     Cefepime >16 Resistant      Ceftazidime >16 Resistant      Ciprofloxacin >2 Resistant      Gentamicin 4 Sensitive      Meropenem* >8 Resistant       * Multiple drug resistant organ seizure do/neuro following  Family meeting planned per primary service to discuss goals of care and poor prognosis    4. Resp failure/vent per pulm  MDRO Acientobacter sputum cw colonizaiotn    5.  Wounds, continue local care, cx taken cw [place] clark    J

## 2019-02-20 NOTE — PROGRESS NOTES
[facility]    Progress Note    Jimenez Hazel Patient Status:  Inpatient    1965 MRN [id number]   [facility] 4SW-A Attending Jen Tyson MD   Hosp Day # 6 PCP Jose Alberto Chavez MD     Subjective:  Jimenez Hazel is a(n) 52year old fem 0.9% 100 mL IVPB 100 mg Intravenous Q24H   levETIRAcetam (KEPPRA) 750 mg in sodium chloride 0.9% 100 mL IVPB 750 mg Intravenous Q12H   propofol (DIPRIVAN) infusion 5-100 mcg/kg/min (Dosing Weight) Intravenous Continuous   Dakins (1/2 strength) (Sodium hypo tablet 4 tablet Oral Q15 Min PRN   Or      dextrose 50 % injection 50 mL 50 mL Intravenous Q15 Min PRN   Or      glucose (DEX4) oral liquid 30 g 30 g Oral Q15 Min PRN   Or      Glucose-Vitamin C (DEX-4) 4-6 GM-MG chewable tab 8 tablet 8 tablet Oral Q15 Min likely hypoxic ischemic injury (cannot get MRI to rule out), or critical illness neuropathy (which would likely persist if continues to be intubated), also would make difficult to extubate  Continue Depakote 1500mg total daily   Continue Keppra 750mg q 12

## 2019-02-20 NOTE — PROGRESS NOTES
[facility]  Nephrology Progress Note    Matilda Sparks Attending:  Woody Dash MD       Assessment and Plan:    1) ESRD- due to diabetes; PD cath removed due to Candida Glabrata in peritoneal fluid cx.  HD today- will not resume PD at any point given FT a 02/20/2019     02/20/2019    CO2 24.0 02/20/2019     02/20/2019    CA 8.1 02/20/2019    PGLU 164 02/20/2019       Imaging: All imaging studies reviewed.     Meds:     Current Facility-Administered Medications:  heparin sodium 1000 UNIT/ML inje mcg Intravenous Q30 Min PRN   acetaminophen (TYLENOL) tab 650 mg 650 mg Oral Q6H PRN   Or      acetaminophen (TYLENOL) 160 MG/5ML oral liquid 650 mg 650 mg Oral Q6H PRN   Or      acetaminophen (TYLENOL) 650 MG rectal suppository 650 mg 650 mg Rectal Q6H NH

## 2019-02-20 NOTE — PROGRESS NOTES
[facility]  Progress Note    Wilman Bob Patient Status:  Inpatient    1965 MRN [id number]   [facility] 4SW-A Attending Tashia Wahl MD   Hosp Day # 6 PCP Jose Alberto Segovia MD     STATUS UPDATE: The family meeting yesterday Ashley Tapia Alert, interactive, no focal deficits    Lab Data Review:  Recent Labs   Lab  02/18/19   0455  02/19/19   0521  02/20/19   0446   RBC  2.68*  2.70*  2.69*   HGB  8.6*  8.7*  8.5*   HCT  28.4*  28.0*  27.9*   MCV  106.0*  103.7*  103.7*   MCH  32.1  32.2  3 CVA/G-tube     #History of PAF on subcu heparin and Plavix apparently     #History of MI/ischemic cardiomyopathy last EF on record 9/17 of 40% with normal RV     #Receiving micafungin for yeast growing from her peritoneal fluid     PLAN  Continue to [facility]

## 2019-02-20 NOTE — PLAN OF CARE
NEUROLOGICAL - ADULT    • Remains free of injury related to seizure activity Completed          NEUROLOGICAL - ADULT    • Achieves maximal functionality and self care Not Progressing          CARDIOVASCULAR - ADULT    • Maintains optimal cardiac output and

## 2019-02-20 NOTE — PLAN OF CARE
NEUROLOGICAL - ADULT    • Achieves maximal functionality and self care Not Progressing          NEUROLOGICAL - ADULT    • Achieves stable or improved neurological status Progressing    • Absence of seizures Progressing          RESPIRATORY - ADULT    • Ach

## 2019-02-20 NOTE — PROGRESS NOTES
[facility]  Progress Note    Lee Goldsmith Patient Status:  Inpatient    1965 MRN [id number]   [facility] 4SW-A Attending Yogesh Acosta MD   Hosp Day # 6 PCP Jose Alberto Saxena MD     Subjective:  Patient off sedation, reports of inter sacral region, unspecified injury stage     Sepsis ([address]     Seizure ([address]     Status epilepticus ([address]     Myoclonus     Peritonitis due to infected peritoneal dialysis catheter ([address]    Infected peritoneal dialysis catheter, status post removal.  Results

## 2019-02-20 NOTE — WOUND PROGRESS NOTE
[facility]  Inpatient Wound Care Contact Note    Osvaldo Do Patient Status:  Inpatient    1965 MRN [id number]   [facility] 4SW-A Attending Marivel Dash MD   Hosp Day # 6 PCP Jose Alberto Harmon MD     Attempted to see patient again

## 2019-02-21 ENCOUNTER — APPOINTMENT (OUTPATIENT)
Dept: GENERAL RADIOLOGY | Facility: HOSPITAL | Age: 54
DRG: 003 | End: 2019-02-21
Attending: SURGERY
Payer: MEDICARE

## 2019-02-21 LAB
ALBUMIN SERPL-MCNC: 1.3 G/DL (ref 3.4–5)
ALBUMIN/GLOB SERPL: 0.3 {RATIO} (ref 1–2)
ALLENS TEST: POSITIVE
ALP LIVER SERPL-CCNC: 582 U/L (ref 41–108)
ALT SERPL-CCNC: 15 U/L (ref 13–56)
ANION GAP SERPL CALC-SCNC: 8 MMOL/L (ref 0–18)
ARTERIAL BLD GAS O2 SATURATION: 96 % (ref 92–100)
ARTERIAL BLOOD GAS BASE EXCESS: 0.8
ARTERIAL BLOOD GAS HCO3: 25.1 MEQ/L (ref 22–26)
ARTERIAL BLOOD GAS PCO2: 39 MM HG (ref 35–45)
ARTERIAL BLOOD GAS PH: 7.43 (ref 7.35–7.45)
ARTERIAL BLOOD GAS PO2: 122 MM HG (ref 80–105)
AST SERPL-CCNC: 14 U/L (ref 15–37)
BASOPHILS # BLD AUTO: 0.04 X10(3) UL (ref 0–0.2)
BASOPHILS NFR BLD AUTO: 0.2 %
BILIRUB SERPL-MCNC: 0.4 MG/DL (ref 0.1–2)
BUN BLD-MCNC: 9 MG/DL (ref 7–18)
BUN/CREAT SERPL: 5.2 (ref 10–20)
CALCIUM BLD-MCNC: 8.3 MG/DL (ref 8.5–10.1)
CALCULATED O2 SATURATION: 99 % (ref 92–100)
CARBOXYHEMOGLOBIN: 1.7 % SAT (ref 0–3)
CHLORIDE SERPL-SCNC: 107 MMOL/L (ref 98–107)
CO2 SERPL-SCNC: 27 MMOL/L (ref 21–32)
CREAT BLD-MCNC: 1.74 MG/DL (ref 0.55–1.02)
DEPRECATED RDW RBC AUTO: 87.9 FL (ref 35.1–46.3)
EOSINOPHIL # BLD AUTO: 0.05 X10(3) UL (ref 0–0.7)
EOSINOPHIL NFR BLD AUTO: 0.3 %
ERYTHROCYTE [DISTWIDTH] IN BLOOD BY AUTOMATED COUNT: 23.2 % (ref 11–15)
FIO2: 30 %
GLOBULIN PLAS-MCNC: 5.2 G/DL (ref 2.8–4.4)
GLUCOSE BLD-MCNC: 132 MG/DL (ref 70–99)
GLUCOSE BLD-MCNC: 180 MG/DL (ref 70–99)
GLUCOSE BLD-MCNC: 189 MG/DL (ref 70–99)
GLUCOSE BLD-MCNC: 197 MG/DL (ref 70–99)
GLUCOSE BLD-MCNC: 200 MG/DL (ref 70–99)
HAV IGM SER QL: 1.9 MG/DL (ref 1.6–2.6)
HCT VFR BLD AUTO: 28.8 % (ref 35–48)
HGB BLD-MCNC: 8.9 G/DL (ref 12–16)
IMM GRANULOCYTES # BLD AUTO: 0.21 X10(3) UL (ref 0–1)
IMM GRANULOCYTES NFR BLD: 1.3 %
LYMPHOCYTES # BLD AUTO: 1.34 X10(3) UL (ref 1–4)
LYMPHOCYTES NFR BLD AUTO: 8.1 %
M PROTEIN MFR SERPL ELPH: 6.5 G/DL (ref 6.4–8.2)
MCH RBC QN AUTO: 32 PG (ref 26–34)
MCHC RBC AUTO-ENTMCNC: 30.9 G/DL (ref 31–37)
MCV RBC AUTO: 103.6 FL (ref 80–100)
METHEMOGLOBIN: 0.5 % SAT (ref 0.4–1.5)
MONOCYTES # BLD AUTO: 1.03 X10(3) UL (ref 0.1–1)
MONOCYTES NFR BLD AUTO: 6.2 %
NEUTROPHILS # BLD AUTO: 13.87 X10 (3) UL (ref 1.5–7.7)
NEUTROPHILS # BLD AUTO: 13.87 X10(3) UL (ref 1.5–7.7)
NEUTROPHILS NFR BLD AUTO: 83.9 %
OSMOLALITY SERPL CALC.SUM OF ELEC: 298 MOSM/KG (ref 275–295)
PATIENT TEMPERATURE: 98.6 F
PEEP: 5 CM H2O
PHOSPHATE SERPL-MCNC: 1.1 MG/DL (ref 2.5–4.9)
PLATELET # BLD AUTO: 492 10(3)UL (ref 150–450)
POTASSIUM SERPL-SCNC: 3.4 MMOL/L (ref 3.5–5.1)
RBC # BLD AUTO: 2.78 X10(6)UL (ref 3.8–5.3)
SODIUM SERPL-SCNC: 142 MMOL/L (ref 136–145)
TIDAL VOLUME: 325 ML
TOTAL HEMOGLOBIN: 8.6 G/DL (ref 11.7–16)
VENT RATE: 12 /MIN
WBC # BLD AUTO: 16.5 X10(3) UL (ref 4–11)

## 2019-02-21 PROCEDURE — 99233 SBSQ HOSP IP/OBS HIGH 50: CPT | Performed by: INTERNAL MEDICINE

## 2019-02-21 PROCEDURE — 99233 SBSQ HOSP IP/OBS HIGH 50: CPT | Performed by: STUDENT IN AN ORGANIZED HEALTH CARE EDUCATION/TRAINING PROGRAM

## 2019-02-21 PROCEDURE — 99233 SBSQ HOSP IP/OBS HIGH 50: CPT | Performed by: OTHER

## 2019-02-21 PROCEDURE — 99233 SBSQ HOSP IP/OBS HIGH 50: CPT | Performed by: CLINICAL NURSE SPECIALIST

## 2019-02-21 PROCEDURE — 71045 X-RAY EXAM CHEST 1 VIEW: CPT | Performed by: SURGERY

## 2019-02-21 RX ORDER — ARIPIPRAZOLE 15 MG/1
40 TABLET ORAL ONCE
Status: COMPLETED | OUTPATIENT
Start: 2019-02-21 | End: 2019-02-21

## 2019-02-21 NOTE — PROGRESS NOTES
[facility]  Progress Note     Jaleesa Babb Patient Status:  Inpatient    1965 MRN [id number]   [facility] 4SW-A Attending Charo Kirby MD   Hosp Day # 7 PCP Ventura Hernandez MD     Chief Complaint: intubated unable to obtain 0.4   TP  6.6  6.2*   --   6.5       Estimated Creatinine Clearance: 30.2 mL/min (A) (based on SCr of 1.74 mg/dL (H)). Recent Labs   Lab  02/15/19   0426   PTP  13.9   INR  1.03       No results for input(s): TROP, CK in the last 168 hours.          Imag scheduled reglan past 5 days. 14. Sacral/buttock, LLE stump, R ankle pressure sores POA  1.  Wound care, picutres on chart reviewed     Quality:  · DVT ppx: heparin  · Code status: full    Case d/w RN, 2 sons Jessa Gates and Edin, palliative care, Luis Hernandez

## 2019-02-21 NOTE — PROGRESS NOTES
[facility]  Nephrology Progress Note    Kylah Escudero Attending:  Larry Cortez MD       Assessment and Plan:    1) ESRD- due to diabetes; PD cath removed due to Candida Glabrata in peritoneal fluid cx.  Will not resume PD at any point given FT and risk fo CREATSERUM 1.74 02/21/2019    BUN 9 02/21/2019     02/21/2019    K 3.4 02/21/2019     02/21/2019    CO2 27.0 02/21/2019     02/21/2019    CA 8.3 02/21/2019    ALB 1.3 02/21/2019    ALKPHO 582 02/21/2019    BILT 0.4 02/21/2019    TP 6.5 0 (SUBLIMAZE) 0.05 MG/ML injection 25 mcg 25 mcg Intravenous Q30 Min PRN   Or      fentaNYL citrate (SUBLIMAZE) 0.05 MG/ML injection 50 mcg 50 mcg Intravenous Q30 Min PRN   acetaminophen (TYLENOL) tab 650 mg 650 mg Oral Q6H PRN   Or      acetaminophen (TYLEN

## 2019-02-21 NOTE — PLAN OF CARE
NEUROLOGICAL - ADULT    • Achieves stable or improved neurological status Not Progressing    • Achieves maximal functionality and self care Not Progressing          RESPIRATORY - ADULT    • Achieves optimal ventilation and oxygenation Progressing

## 2019-02-21 NOTE — PROGRESS NOTES
02/21/19 0855   Weaning Trials   Patient self-extubated? No   Compassionate wean?  No   Spontaneous Breathing Trial Time Initiated 0855   Spontaneous Breathing Trial Duration 1hr   Spontaneous Breathing Trial Method cpap   Spontaneous Breathing Trial Set

## 2019-02-21 NOTE — PROGRESS NOTES
[facility]  Progress Note    Yanely Walker Patient Status:  Inpatient    1965 MRN [id number]   [facility] 4SW-A Attending Uriel Rae MD   Hosp Day # 7 PCP Jose Alberto Trejo MD     Subjective:  Yanely Walker is a(n) 52year old female with some waxing and waning of left suspected atelectasis possible small effusion    Medications reviewed     Assessment and Plan:   Patient Active Problem List:     Altered mental status     Urinary tract infection without hematuria     ESRD (end stage re

## 2019-02-21 NOTE — PROGRESS NOTES
[facility]    Progress Note    Yanely Walker Patient Status:  Inpatient    1965 MRN [id number]   [facility] 4SW-A Attending Uriel Rae MD   Hosp Day # 7 PCP Jose Alberto Trejo MD     Subjective:  Yanely Walker is a(n) 52year old fem Once   acetylcysteine (MUCOMYST) 20 % solution 2 mL 2 mL Nebulization Q4H   multivitamin (DIALYVITE - RENAL) tab 1 tablet 1 tablet Oral Daily   Metoclopramide HCl (REGLAN) injection 5 mg 5 mg Intravenous 2 times per day   Micafungin Sodium (MYCAMINE) 100 m liquid 15 g 15 g Oral Q15 Min PRN   Or      Glucose-Vitamin C (DEX-4) 4-6 GM-MG chewable tab 4 tablet 4 tablet Oral Q15 Min PRN   Or      dextrose 50 % injection 50 mL 50 mL Intravenous Q15 Min PRN   Or      glucose (DEX4) oral liquid 30 g 30 g Oral Q15 Mi baseline, with severe decubitus ulcers  ESRD   DM        Plan:  Continued minimal to no responsiveness, though moving right side more  Given no clear hypoxic event and lack of interval myoclonic activity, prior EEG pattern was more consistent with myocloni

## 2019-02-21 NOTE — PALLIATIVE CARE NOTE
1808 Khari Huntley Follow Up      Jimenez Hazel  [id number]  Patient seen at: [facility]    Patient seen and evaluated, family, sons Kylah Lee and Dave Mustapha at bedside.      Review of Systems:   Palliative Care symptom needs assessed: alannah seemed open to this and were in agreement that if she did not return to a state where she could interact with them they would re-evaluate ongoing aggressive care.      Disposition: ongoing goals of care discussions    Palliative Care Recommendations/Plan:

## 2019-02-21 NOTE — PROGRESS NOTES
[facility]                INFECTIOUS DISEASE PROGRESS NOTE    Yanely  Patient Status:  Inpatient    1965 MRN [id number]   [facility] 4SW-A Attending Timothy Ramirez MD   Hosp Day # 7 PCP Manuel Bran MD     Antibiotics:Ranulfo Lymphocyte Peritoneal 37 %     Mono/Mac/Histio Peritoneal 42 %     Eosinophils Peritoneal 0 %     Basophil Peritoneal 0 %     Total Cells Counted 100   Cell Count, peritoneal fluid Once [[id number]] Collected: 02/14/19 1410   Specimen:  Body fluid, unspecifi is currently available.     Pseudomonas aeruginosa -  (no method available)     Cefepime >16 Resistant      Ceftazidime >16 Resistant      Ciprofloxacin >2 Resistant      Gentamicin 4 Sensitive      Meropenem* >8 Resistant       * Multiple drug resistant or Glabrata  -continue micafungin through 2/27    3. Neuro/status/encephalopathy, anoxic brain injury    4. Resp failure/vent per pulm  MDRO Acientobacter sputum cw colonizaiotn    5.  Wounds, continue local care, cx taken cw [place]    [place] Records

## 2019-02-21 NOTE — CM/SW NOTE
02/21/19 1600   CM/SW Referral Data   Referral Source Physician   Reason for Referral Discharge planning  (ltac)   Informant Children   Patient Richard Herrera Name [facility])   Discharge Needs   Anticipat

## 2019-02-22 ENCOUNTER — APPOINTMENT (OUTPATIENT)
Dept: GENERAL RADIOLOGY | Facility: HOSPITAL | Age: 54
DRG: 003 | End: 2019-02-22
Attending: PHYSICIAN ASSISTANT
Payer: MEDICARE

## 2019-02-22 ENCOUNTER — APPOINTMENT (OUTPATIENT)
Dept: GENERAL RADIOLOGY | Facility: HOSPITAL | Age: 54
DRG: 003 | End: 2019-02-22
Attending: SURGERY
Payer: MEDICARE

## 2019-02-22 LAB
ALBUMIN SERPL-MCNC: 1.3 G/DL (ref 3.4–5)
ALBUMIN/GLOB SERPL: 0.3 {RATIO} (ref 1–2)
ALLENS TEST: POSITIVE
ALP LIVER SERPL-CCNC: 503 U/L (ref 41–108)
ALT SERPL-CCNC: 13 U/L (ref 13–56)
ANION GAP SERPL CALC-SCNC: 9 MMOL/L (ref 0–18)
ARTERIAL BLD GAS O2 SATURATION: 96 % (ref 92–100)
ARTERIAL BLOOD GAS BASE EXCESS: -0.7
ARTERIAL BLOOD GAS HCO3: 24.1 MEQ/L (ref 22–26)
ARTERIAL BLOOD GAS PCO2: 40 MM HG (ref 35–45)
ARTERIAL BLOOD GAS PH: 7.39 (ref 7.35–7.45)
ARTERIAL BLOOD GAS PO2: 138 MM HG (ref 80–105)
AST SERPL-CCNC: 12 U/L (ref 15–37)
BASOPHILS # BLD: 0 X10(3) UL (ref 0–0.2)
BASOPHILS NFR BLD: 0 %
BILIRUB SERPL-MCNC: 0.4 MG/DL (ref 0.1–2)
BUN BLD-MCNC: 13 MG/DL (ref 7–18)
BUN/CREAT SERPL: 5.6 (ref 10–20)
CALCIUM BLD-MCNC: 8.8 MG/DL (ref 8.5–10.1)
CALCULATED O2 SATURATION: 99 % (ref 92–100)
CARBOXYHEMOGLOBIN: 1.7 % SAT (ref 0–3)
CHLORIDE SERPL-SCNC: 108 MMOL/L (ref 98–107)
CO2 SERPL-SCNC: 27 MMOL/L (ref 21–32)
CREAT BLD-MCNC: 2.31 MG/DL (ref 0.55–1.02)
DEPRECATED RDW RBC AUTO: 87.3 FL (ref 35.1–46.3)
EOSINOPHIL # BLD: 0.17 X10(3) UL (ref 0–0.7)
EOSINOPHIL NFR BLD: 1 %
ERYTHROCYTE [DISTWIDTH] IN BLOOD BY AUTOMATED COUNT: 23.2 % (ref 11–15)
FIO2: 30 %
GLOBULIN PLAS-MCNC: 5.1 G/DL (ref 2.8–4.4)
GLUCOSE BLD-MCNC: 103 MG/DL (ref 70–99)
GLUCOSE BLD-MCNC: 149 MG/DL (ref 70–99)
GLUCOSE BLD-MCNC: 151 MG/DL (ref 70–99)
GLUCOSE BLD-MCNC: 160 MG/DL (ref 70–99)
HAV IGM SER QL: 1.9 MG/DL (ref 1.6–2.6)
HBV CORE AB SERPL QL IA: NONREACTIVE
HBV SURFACE AB SER QL: REACTIVE
HBV SURFACE AB SERPL IA-ACNC: 17.9 MIU/ML
HBV SURFACE AG SER-ACNC: <0.1 [IU]/L
HBV SURFACE AG SERPL QL IA: NONREACTIVE
HCT VFR BLD AUTO: 28.9 % (ref 35–48)
HGB BLD-MCNC: 9.1 G/DL (ref 12–16)
LYMPHOCYTES NFR BLD: 1 X10(3) UL (ref 1–4)
LYMPHOCYTES NFR BLD: 6 %
M PROTEIN MFR SERPL ELPH: 6.4 G/DL (ref 6.4–8.2)
MCH RBC QN AUTO: 32.5 PG (ref 26–34)
MCHC RBC AUTO-ENTMCNC: 31.5 G/DL (ref 31–37)
MCV RBC AUTO: 103.2 FL (ref 80–100)
METHEMOGLOBIN: 0.6 % SAT (ref 0.4–1.5)
MONOCYTES # BLD: 0.83 X10(3) UL (ref 0.1–1)
MONOCYTES NFR BLD: 5 %
NEUTROPHILS # BLD AUTO: 13.49 X10 (3) UL (ref 1.5–7.7)
NEUTROPHILS NFR BLD: 86 %
NEUTS BAND NFR BLD: 2 %
NEUTS HYPERSEG # BLD: 14.61 X10(3) UL (ref 1.5–7.7)
OSMOLALITY SERPL CALC.SUM OF ELEC: 302 MOSM/KG (ref 275–295)
PATIENT TEMPERATURE: 98.6 F
PEEP: 5 CM H2O
PLATELET # BLD AUTO: 423 10(3)UL (ref 150–450)
PLATELET MORPHOLOGY: NORMAL
POTASSIUM SERPL-SCNC: 3.4 MMOL/L (ref 3.5–5.1)
RBC # BLD AUTO: 2.8 X10(6)UL (ref 3.8–5.3)
SODIUM SERPL-SCNC: 144 MMOL/L (ref 136–145)
TIDAL VOLUME: 325 ML
TOTAL CELLS COUNTED: 100
TOTAL HEMOGLOBIN: 9 G/DL (ref 11.7–16)
VENT RATE: 12 /MIN
WBC # BLD AUTO: 16.6 X10(3) UL (ref 4–11)

## 2019-02-22 PROCEDURE — 99233 SBSQ HOSP IP/OBS HIGH 50: CPT | Performed by: STUDENT IN AN ORGANIZED HEALTH CARE EDUCATION/TRAINING PROGRAM

## 2019-02-22 PROCEDURE — 71045 X-RAY EXAM CHEST 1 VIEW: CPT | Performed by: SURGERY

## 2019-02-22 PROCEDURE — 99233 SBSQ HOSP IP/OBS HIGH 50: CPT | Performed by: INTERNAL MEDICINE

## 2019-02-22 PROCEDURE — 99233 SBSQ HOSP IP/OBS HIGH 50: CPT | Performed by: OTHER

## 2019-02-22 PROCEDURE — 74018 RADEX ABDOMEN 1 VIEW: CPT | Performed by: PHYSICIAN ASSISTANT

## 2019-02-22 RX ORDER — HEPARIN SODIUM 1000 [USP'U]/ML
2000 INJECTION, SOLUTION INTRAVENOUS; SUBCUTANEOUS
Status: DISCONTINUED | OUTPATIENT
Start: 2019-02-22 | End: 2019-02-22

## 2019-02-22 RX ORDER — HEPARIN SODIUM 1000 [USP'U]/ML
3000 INJECTION, SOLUTION INTRAVENOUS; SUBCUTANEOUS
Status: DISCONTINUED | OUTPATIENT
Start: 2019-02-22 | End: 2019-03-09

## 2019-02-22 RX ORDER — ARIPIPRAZOLE 15 MG/1
40 TABLET ORAL ONCE
Status: COMPLETED | OUTPATIENT
Start: 2019-02-22 | End: 2019-02-22

## 2019-02-22 NOTE — PLAN OF CARE
Delirium    • Minimize duration of delirium Not Progressing        Diabetes/Glucose Control    • Glucose maintained within prescribed range Not Progressing        NEUROLOGICAL - ADULT    • Achieves stable or improved neurological status Not Progressing trials. Wound care done. Will continue to monitor the patient.

## 2019-02-22 NOTE — PROGRESS NOTES
[facility]  Progress Note    Nohemi Reilly Patient Status:  Inpatient    1965 MRN [id number]   [facility] 4SW-A Attending Deven Andres MD   Hosp Day # 8 PCP Jose Alberto Downs MD        Subjective:  Nohemi Reilly is a(n) 52year old fema tube feeds   Extremity: No edema, no cyanosis   Neurological: as above    Lab Data Review:  Recent Labs   Lab  02/20/19   0446  02/21/19   0408  02/22/19   0504   RBC  2.69*  2.78*  2.80*   HGB  8.5*  8.9*  9.1*   HCT  27.9*  28.8*  28.9*   MCV  103.7*  10 subcu heparin and Plavix apparently     #History of MI/ischemic cardiomyopathy last EF on record 9/17 of 40% with normal RV     #Receiving micafungin for yeast growing from her peritoneal fluid     PLAN  Continue to monitor hemodynamics in the intensive ca

## 2019-02-22 NOTE — PROGRESS NOTES
[facility]  Nephrology Progress Note    Clemencia Da Silva Attending:  Irma Cruz MD       Assessment and Plan:    1) ESRD- due to diabetes; PD cath removed due to Candida Glabrata in peritoneal fluid cx.  Will not resume PD at any point given FT and risk fo 02/22/2019    .0 02/22/2019    CREATSERUM 2.31 02/22/2019    BUN 13 02/22/2019     02/22/2019    K 3.4 02/22/2019     02/22/2019    CO2 27.0 02/22/2019     02/22/2019    CA 8.8 02/22/2019    ALB 1.3 02/22/2019    ALKPHO 503 02/22/ (REGLAN) injection 5 mg 5 mg Intravenous Q8H PRN   acetaminophen (TYLENOL) tab 650 mg 650 mg Oral Q6H PRN   Or      acetaminophen (TYLENOL) 160 MG/5ML oral liquid 650 mg 650 mg Oral Q6H PRN   Or      acetaminophen (TYLENOL) 650 MG rectal suppository 650 mg

## 2019-02-22 NOTE — PROGRESS NOTES
[address] [place] Neurology Progress Note    Delma Russell Patient Status:  Inpatient    1965 MRN [id number]   [facility] 4SW-A Attending Roxy Raymond MD   Hosp Day # 8 PCP Billy Amos MD     CC: Seizures    Subjective:  Vilma Nicole greater than left fronto-central areas, consistent with neurophysiologic dysfunction.     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

## 2019-02-22 NOTE — CM/SW NOTE
CM called and spoke with son Kyler Edward. Listened and provided support as son talked about the poor care he felt pt received at [facility] and so is expressing concerns that all LTACs are the same. Did provide Kyler Villareallim with info on medicare. gov website for r

## 2019-02-22 NOTE — PROGRESS NOTES
[facility]                INFECTIOUS DISEASE PROGRESS NOTE    Maye Denver Patient Status:  Inpatient    1965 MRN [id number]   [facility] 4SW-A Attending Nasreen Kurtz MD   Hosp Day # 8 PCP Mandy Masterson MD     Antibiotics:Ranulfo Updated: 02/14/19 1535    Neutrophils Peritoneal 21 %     Lymphocyte Peritoneal 37 %     Mono/Mac/Histio Peritoneal 42 %     Eosinophils Peritoneal 0 %     Basophil Peritoneal 0 %     Total Cells Counted 100   Cell Count, peritoneal fluid Once [[id number]] * This antibiotic result is an MICHAEL. No interpretation is currently available.     Pseudomonas aeruginosa -  (no method available)     Cefepime >16 Resistant      Ceftazidime >16 Resistant      Ciprofloxacin >2 Resistant      Gentamicin 4 Sensitive catheter removal 2/17  Fluid aspiration growing Candida Glabrata  -continue micafungin through 2/27    3. Neuro/status/encephalopathy, anoxic brain injury  Awaiting family decisions on possible withdrawl    4.  Resp failure/vent per pulm  MDRO Acientobacter

## 2019-02-22 NOTE — DIETARY NOTE
NUTRITION Follow up  ASSESSMENT    Pt is at high nutrition risk. Pt does not meet malnutrition criteria.     NUTRITION DIAGNOSIS/PROBLEM:    Inadequate oral intake related to inability to consume sufficient energy as evidenced by the need for g tube feeding followed? No      Reason if recommendations were not followed: Pt is not tolerating g tube feedings      FOOD/NUTRITION STATUS:  Intake Meeting Needs: No- TF currently Held    ANTHROPOMETRICS:  Ht: 160 cm (5' 3\")  Wt: 59.5 kg (131 lb 2.8 oz).  This is 123

## 2019-02-22 NOTE — CONSULTS
[facility]  Inpatient Wound Care Contact Note    Angela Ponce Patient Status:  Inpatient    1965 MRN [id number]   [facility] 4SW-A Attending Taylor Alvarez MD   Hosp Day # 8 PCP Jose Alberto Katz MD     Attempted to see patient for fo

## 2019-02-22 NOTE — PROGRESS NOTES
[facility]  Progress Note     Eugene Jimenez Patient Status:  Inpatient    1965 MRN [id number]   [facility] 4SW-A Attending Sreedhar Thomas MD   Hosp Day # 8 PCP Nicole Dc MD     Chief Complaint: intubated unable to obtain Estimated Creatinine Clearance: 22.8 mL/min (A) (based on SCr of 2.31 mg/dL (H)). No results for input(s): PTP, INR in the last 168 hours. No results for input(s): TROP, CK in the last 168 hours. Imaging: Imaging data reviewed in Epic. passing stool  14. Sacral/buttock, LLE stump, R ankle pressure sores POA  1.  Wound care, pictures on chart reviewed, has rectal tube     Quality:  · DVT ppx: heparin  · Code status: full    Estimated date of discharge: once stable after trach - will need L

## 2019-02-22 NOTE — PLAN OF CARE
NEUROLOGICAL - ADULT    • Achieves maximal functionality and self care Not Progressing        RESPIRATORY - ADULT    • Achieves optimal ventilation and oxygenation Not Progressing          NEUROLOGICAL - ADULT    • Achieves stable or improved neurological

## 2019-02-22 NOTE — PLAN OF CARE
Called son Jamel Aldana with update on issues with feeding. She reports she had difficulty tolerating feeding prior to admission. He is aware of her pending abdominal x-ray. He reports he doesn't want to decide long-term acute care at this time.   I encouraged

## 2019-02-23 LAB
ALBUMIN SERPL-MCNC: 1.3 G/DL (ref 3.4–5)
ALBUMIN/GLOB SERPL: 0.3 {RATIO} (ref 1–2)
ALP LIVER SERPL-CCNC: 504 U/L (ref 41–108)
ALT SERPL-CCNC: 13 U/L (ref 13–56)
ANION GAP SERPL CALC-SCNC: 10 MMOL/L (ref 0–18)
AST SERPL-CCNC: 14 U/L (ref 15–37)
BASOPHILS # BLD AUTO: 0.07 X10(3) UL (ref 0–0.2)
BASOPHILS NFR BLD AUTO: 0.4 %
BILIRUB SERPL-MCNC: 0.4 MG/DL (ref 0.1–2)
BUN BLD-MCNC: 8 MG/DL (ref 7–18)
BUN/CREAT SERPL: 5.3 (ref 10–20)
CALCIUM BLD-MCNC: 8.4 MG/DL (ref 8.5–10.1)
CHLORIDE SERPL-SCNC: 107 MMOL/L (ref 98–107)
CO2 SERPL-SCNC: 27 MMOL/L (ref 21–32)
CREAT BLD-MCNC: 1.5 MG/DL (ref 0.55–1.02)
DEPRECATED RDW RBC AUTO: 90.5 FL (ref 35.1–46.3)
EOSINOPHIL # BLD AUTO: 0.04 X10(3) UL (ref 0–0.7)
EOSINOPHIL NFR BLD AUTO: 0.2 %
ERYTHROCYTE [DISTWIDTH] IN BLOOD BY AUTOMATED COUNT: 23.6 % (ref 11–15)
GLOBULIN PLAS-MCNC: 4.9 G/DL (ref 2.8–4.4)
GLUCOSE BLD-MCNC: 149 MG/DL (ref 70–99)
GLUCOSE BLD-MCNC: 157 MG/DL (ref 70–99)
GLUCOSE BLD-MCNC: 157 MG/DL (ref 70–99)
GLUCOSE BLD-MCNC: 162 MG/DL (ref 70–99)
GLUCOSE BLD-MCNC: 172 MG/DL (ref 70–99)
HAV IGM SER QL: 1.8 MG/DL (ref 1.6–2.6)
HCT VFR BLD AUTO: 28.1 % (ref 35–48)
HGB BLD-MCNC: 8.3 G/DL (ref 12–16)
IMM GRANULOCYTES # BLD AUTO: 0.46 X10(3) UL (ref 0–1)
IMM GRANULOCYTES NFR BLD: 2.6 %
LYMPHOCYTES # BLD AUTO: 1.51 X10(3) UL (ref 1–4)
LYMPHOCYTES NFR BLD AUTO: 8.7 %
M PROTEIN MFR SERPL ELPH: 6.2 G/DL (ref 6.4–8.2)
MCH RBC QN AUTO: 31.1 PG (ref 26–34)
MCHC RBC AUTO-ENTMCNC: 29.5 G/DL (ref 31–37)
MCV RBC AUTO: 105.2 FL (ref 80–100)
MONOCYTES # BLD AUTO: 1.47 X10(3) UL (ref 0.1–1)
MONOCYTES NFR BLD AUTO: 8.4 %
NEUTROPHILS # BLD AUTO: 13.88 X10 (3) UL (ref 1.5–7.7)
NEUTROPHILS # BLD AUTO: 13.88 X10(3) UL (ref 1.5–7.7)
NEUTROPHILS NFR BLD AUTO: 79.7 %
OSMOLALITY SERPL CALC.SUM OF ELEC: 300 MOSM/KG (ref 275–295)
PLATELET # BLD AUTO: 363 10(3)UL (ref 150–450)
POTASSIUM SERPL-SCNC: 3.7 MMOL/L (ref 3.5–5.1)
RBC # BLD AUTO: 2.67 X10(6)UL (ref 3.8–5.3)
SODIUM SERPL-SCNC: 144 MMOL/L (ref 136–145)
WBC # BLD AUTO: 17.4 X10(3) UL (ref 4–11)

## 2019-02-23 PROCEDURE — 99233 SBSQ HOSP IP/OBS HIGH 50: CPT | Performed by: INTERNAL MEDICINE

## 2019-02-23 PROCEDURE — 99232 SBSQ HOSP IP/OBS MODERATE 35: CPT | Performed by: STUDENT IN AN ORGANIZED HEALTH CARE EDUCATION/TRAINING PROGRAM

## 2019-02-23 RX ORDER — METOCLOPRAMIDE HYDROCHLORIDE 5 MG/ML
10 INJECTION INTRAMUSCULAR; INTRAVENOUS EVERY 12 HOURS SCHEDULED
Status: DISCONTINUED | OUTPATIENT
Start: 2019-02-23 | End: 2019-02-25

## 2019-02-23 NOTE — PROGRESS NOTES
[facility]  Nephrology Progress Note    Jaleesa Babb Attending:  Ashley Posada MD         Subjective:   Intubated  Opens eyes to stimuli- does not respond purposefully otherwise      Current Facility-Administered Medications:  heparin sodium 1000 UNIT/ML mg 10 mg Rectal Daily PRN   Chlorhexidine Gluconate (PERIDEX) 0.12 % solution 15 mL 15 mL Mouth/Throat Michael@[employer]   Midazolam HCl (VERSED) 2 MG/2ML injection 2 mg 2 mg Intravenous Q5 Min PRN   Pantoprazole Sodium (PROTONIX) 40 mg in Sodium Chloride 0.9 8. 9*  9.1*  8.3*   MCV  103.6*  103.2*  105.2*   PLT  492.0*  423.0  363.0   BAND   --   2   --        Recent Labs      02/21/19   0408  02/22/19   0504  02/23/19   0352   NA  142  144  144   K  3.4*  3.4*  3.7   CL  107  108*  107   CO2  27.0  27.0  27.0

## 2019-02-23 NOTE — PROGRESS NOTES
[facility]  Progress Note    Deepika Marino Patient Status:  Inpatient    1965 MRN [id number]   [facility] 4SW-A Attending Aliza Burk MD   Hosp Day # 9 PCP Jose Alberto Clayton MD        Subjective:  Deepika Marino is a(n) 52year old fema stool.  Not tolerating tube feeds   Extremity: No edema, no cyanosis, s/p left BKA   Neurological: opening eyes spontaneously, some tracking    Lab Data Review:  Recent Labs   Lab  02/21/19   0408  02/22/19   0504  02/23/19   0352   RBC  2.78*  2.80*  2.67 future secondary to yeast peritonitis and removal of her intra-abdominal catheter.     #History of CVA/G-tube     #History of PAF on subcu heparin and Plavix      #History of MI/ischemic cardiomyopathy last EF on record 9/17 of 40% with normal RV     #Rece

## 2019-02-23 NOTE — PLAN OF CARE
RESPIRATORY - ADULT    • Achieves optimal ventilation and oxygenation Not Progressing          NEUROLOGICAL - ADULT    • Achieves stable or improved neurological status Progressing    • Absence of seizures Progressing          Assumed care of patient at 07

## 2019-02-23 NOTE — PLAN OF CARE
Assumed care of patient at [address] [place] [address]. Patient will open eyes spontaneously and occasionally will track nurse in room. Pt only moves RUE very slightly to painful stimuli, no movement in other extremities observed. Does not follow commands.  Gag / cough reflexes p

## 2019-02-23 NOTE — PROGRESS NOTES
[facility]  Progress Note     Lee Goldsmith Patient Status:  Inpatient    1965 MRN [id number]   [facility] 4SW-A Attending Ruddy Landry MD   Hosp Day # 9 PCP Ny Burnette MD     Chief Complaint: intubated unable to obtain results for input(s): TROP, CK in the last 168 hours. Imaging: Imaging data reviewed in Epic.     Medications:   • Insulin Aspart Pen  1-15 Units Subcutaneous 4 times per day   • levETIRAcetam  750 mg Per NG Tube 2 times per day   • Valproate Sodium trach - will need LTAC - CM/SW following    Case d/w day/night RNs. Spoke with son Bruno Ayala yesterday about difficulty feeding and he reported this was an issue prior to admission. GI c/s.      Duane MARVIN  8:17 AM     Pt more awake today- awakens to h

## 2019-02-23 NOTE — PROGRESS NOTES
[facility]                INFECTIOUS DISEASE PROGRESS NOTE    Eugene Jimenez Patient Status:  Inpatient    1965 MRN [id number]   [facility] 4SW-A Attending Zhao Barkley MD   Hosp Day # 9 PCP Nicole Dc MD     Antibiotics:Ranulfo 21 %     Lymphocyte Peritoneal 37 %     Mono/Mac/Histio Peritoneal 42 %     Eosinophils Peritoneal 0 %     Basophil Peritoneal 0 %     Total Cells Counted 100   Cell Count, peritoneal fluid Once [[id number]] Collected: 02/14/19 1410   Specimen:  Body fluid, interpretation is currently available.     Pseudomonas aeruginosa -  (no method available)     Cefepime >16 Resistant      Ceftazidime >16 Resistant      Ciprofloxacin >2 Resistant      Gentamicin 4 Sensitive      Meropenem* >8 Resistant       * Multiple dr growing Candida Glabrata  -continue micafungin through 2/27    3. Neuro/status/encephalopathy, anoxic brain injury  Awaiting family decisions on possible withdrawl    4.  Resp failure/vent per pulm  MDRO Acientobacter sputum cw colonizaiotn  Possible trach

## 2019-02-24 ENCOUNTER — APPOINTMENT (OUTPATIENT)
Dept: GENERAL RADIOLOGY | Facility: HOSPITAL | Age: 54
DRG: 003 | End: 2019-02-24
Attending: NURSE PRACTITIONER
Payer: MEDICARE

## 2019-02-24 LAB
ANION GAP SERPL CALC-SCNC: 11 MMOL/L (ref 0–18)
BUN BLD-MCNC: 12 MG/DL (ref 7–18)
BUN/CREAT SERPL: 5.4 (ref 10–20)
CALCIUM BLD-MCNC: 9 MG/DL (ref 8.5–10.1)
CHLORIDE SERPL-SCNC: 108 MMOL/L (ref 98–107)
CO2 SERPL-SCNC: 25 MMOL/L (ref 21–32)
CREAT BLD-MCNC: 2.24 MG/DL (ref 0.55–1.02)
DEPRECATED RDW RBC AUTO: 88.7 FL (ref 35.1–46.3)
ERYTHROCYTE [DISTWIDTH] IN BLOOD BY AUTOMATED COUNT: 23.4 % (ref 11–15)
GLUCOSE BLD-MCNC: 177 MG/DL (ref 70–99)
GLUCOSE BLD-MCNC: 183 MG/DL (ref 70–99)
GLUCOSE BLD-MCNC: 188 MG/DL (ref 70–99)
GLUCOSE BLD-MCNC: 201 MG/DL (ref 70–99)
GLUCOSE BLD-MCNC: 221 MG/DL (ref 70–99)
GLUCOSE BLD-MCNC: 222 MG/DL (ref 70–99)
HAV IGM SER QL: 1.9 MG/DL (ref 1.6–2.6)
HCT VFR BLD AUTO: 28.4 % (ref 35–48)
HGB BLD-MCNC: 8.9 G/DL (ref 12–16)
MCH RBC QN AUTO: 32.7 PG (ref 26–34)
MCHC RBC AUTO-ENTMCNC: 31.3 G/DL (ref 31–37)
MCV RBC AUTO: 104.4 FL (ref 80–100)
OSMOLALITY SERPL CALC.SUM OF ELEC: 305 MOSM/KG (ref 275–295)
PHOSPHATE SERPL-MCNC: 1.3 MG/DL (ref 2.5–4.9)
PLATELET # BLD AUTO: 345 10(3)UL (ref 150–450)
POTASSIUM SERPL-SCNC: 3.5 MMOL/L (ref 3.5–5.1)
RBC # BLD AUTO: 2.72 X10(6)UL (ref 3.8–5.3)
SODIUM SERPL-SCNC: 144 MMOL/L (ref 136–145)
TRIGL SERPL-MCNC: 359 MG/DL (ref 30–149)
WBC # BLD AUTO: 17.8 X10(3) UL (ref 4–11)

## 2019-02-24 PROCEDURE — 71045 X-RAY EXAM CHEST 1 VIEW: CPT | Performed by: NURSE PRACTITIONER

## 2019-02-24 PROCEDURE — 99232 SBSQ HOSP IP/OBS MODERATE 35: CPT | Performed by: INTERNAL MEDICINE

## 2019-02-24 PROCEDURE — 99233 SBSQ HOSP IP/OBS HIGH 50: CPT | Performed by: OTHER

## 2019-02-24 PROCEDURE — 99232 SBSQ HOSP IP/OBS MODERATE 35: CPT | Performed by: STUDENT IN AN ORGANIZED HEALTH CARE EDUCATION/TRAINING PROGRAM

## 2019-02-24 RX ORDER — MORPHINE SULFATE 4 MG/ML
2 INJECTION, SOLUTION INTRAMUSCULAR; INTRAVENOUS EVERY 4 HOURS PRN
Status: DISCONTINUED | OUTPATIENT
Start: 2019-02-24 | End: 2019-03-09

## 2019-02-24 RX ORDER — FLUCONAZOLE 100 MG/1
200 TABLET ORAL DAILY
Status: DISCONTINUED | OUTPATIENT
Start: 2019-02-24 | End: 2019-02-24

## 2019-02-24 RX ORDER — FLUCONAZOLE 100 MG/1
200 TABLET ORAL DAILY
Status: DISCONTINUED | OUTPATIENT
Start: 2019-02-24 | End: 2019-02-26

## 2019-02-24 RX ORDER — HYDRALAZINE HYDROCHLORIDE 20 MG/ML
10 INJECTION INTRAMUSCULAR; INTRAVENOUS EVERY 4 HOURS PRN
Status: DISCONTINUED | OUTPATIENT
Start: 2019-02-24 | End: 2019-03-09

## 2019-02-24 RX ORDER — MORPHINE SULFATE 4 MG/ML
1 INJECTION, SOLUTION INTRAMUSCULAR; INTRAVENOUS EVERY 4 HOURS PRN
Status: DISCONTINUED | OUTPATIENT
Start: 2019-02-24 | End: 2019-03-09

## 2019-02-24 NOTE — PROGRESS NOTES
[facility]  Progress Note    Eusebia Munroe Patient Status:  Inpatient    1965 MRN [id number]   [facility] 4SW-A Attending Charu Avilez MD   Hosp Day # 10 PCP Karen Hood MD        Subjective:  Eusebia Munroe is a(n) 52year old fem no   rebound, hypoactive BS.   Not tolerating tube feeds   Extremity: No edema, no cyanosis, s/p left BKA   Neurological: not opening eyes spontaneously, not tracking    Lab Data Review:  Recent Labs   Lab  02/21/19   0408  02/22/19   0504  02/23/19   6668 present.     #History of end-stage renal disease with renal following.  On hemodialysis for the foreseeable future secondary to yeast peritonitis and removal of her intra-abdominal catheter.     #History of CVA/G-tube     #History of PAF on subcu heparin a

## 2019-02-24 NOTE — PROGRESS NOTES
[facility]  Nephrology Progress Note    Gonzales Zheng Attending:  Prerna Anna MD         Subjective:   Intubated  No sig change from yesterday        Current Facility-Administered Medications:  Potassium Phosphate Dibasic 30 mmol in sodium chloride 0.9% (DULCOLAX) rectal suppository 10 mg 10 mg Rectal Daily PRN   Chlorhexidine Gluconate (PERIDEX) 0.12 % solution 15 mL 15 mL Mouth/Throat [email or web address]   Midazolam HCl (VERSED) 2 MG/2ML injection 2 mg 2 mg Intravenous Q5 Min PRN   Pantoprazole Sodium (PROTON 16.6*  17.4*  17.8*   HGB  9.1*  8.3*  8.9*   MCV  103.2*  105.2*  104.4*   PLT  423.0  363.0  345.0   BAND  2   --    --        Recent Labs      02/22/19   0504  02/23/19   0352  02/24/19   0510   NA  144  144  144   K  3.4*  3.7  3.5   CL  108*  107  108

## 2019-02-24 NOTE — PROGRESS NOTES
[address] [address]  Neurology  Notes  Affiliated with [facility]  2019, 7:39 AM     Sage Bentley Patient Status:  Inpatient    1965 MRN [id number]   [facility] 4SW-A Attending Shona Lowe MD   1612 Replaced by [facility] HCl, Metoclopramide HCl, acetaminophen **OR** acetaminophen **OR** acetaminophen, PEG 3350, bisacodyl, Midazolam HCl, glucose **OR** Glucose-Vitamin C **OR** dextrose **OR** glucose **OR** Glucose-Vitamin C, norepinephrine, vasopressin (PITRESSIN) infusion K  3.4*  3.4*  3.7  3.5   CL  107  108*  107  108*   CO2  27.0  27.0  27.0  25.0   ALKPHO  582*  503*  504*   --    AST  14*  12*  14*   --    ALT  15  13  13   --    BILT  0.4  0.4  0.4   --    TP  6.5  6.4  6.2*   --                Assessment & Discuss

## 2019-02-24 NOTE — CONSULTS
[facility]  Report of GI Consultation    Gonzales Zheng Patient Status:  Inpatient    1965 MRN [id number]   [facility] 4SW-A Attending Melo Escamilla MD   Hosp Day # 8 PCP Elsa Canseco MD     Date of Admission:  2019  Date of Intravenous PRN Dialysis   Insulin Aspart Pen (NOVOLOG) 100 UNIT/ML flexpen 1-15 Units 1-15 Units Subcutaneous 4 times per day   levETIRAcetam (KEPPRA) tab 750 mg 750 mg Per NG Tube 2 times per day   Valproate Sodium (DEPAKENE) soln 750 mg 750 mg Per NG Tu 50 % injection 50 mL 50 mL Intravenous Q15 Min PRN   Or      glucose (DEX4) oral liquid 30 g 30 g Oral Q15 Min PRN   Or      Glucose-Vitamin C (DEX-4) 4-6 GM-MG chewable tab 8 tablet 8 tablet Oral Q15 Min PRN   norepinephrine (LEVOPHED) 4 mg/250 ml premix 0510   ALT  15  13  13   --    AST  14*  12*  14*   --    ALKPHO  582*  503*  504*   --    BILT  0.4  0.4  0.4   --    HGB  8.9*  9.1*  8.3*  8.9*   WBC  16.5*  16.6*  17.4*  17.8*       Imaging:  Xr Abdomen (1 View) (cpt=74018)    Result Date: 2/22/2019

## 2019-02-24 NOTE — PROGRESS NOTES
[facility]                INFECTIOUS DISEASE PROGRESS NOTE    Eugene Jimenez Patient Status:  Inpatient    1965 MRN [id number]   [facility] 4SW-A Attending Zhao Barkley MD   Hosp Day # 10 PCP Nicole Dc MD     Antibiotics:Mi 6.2*   --        No results found for: [facility]  Microbiology  CELL COUNT/DIFF PERITONEAL FL Once [[id number]] Collected: 02/14/19 1410   Specimen:  Body fluid, unspecified Updated: 02/14/19 1535    Neutrophils Peritoneal 21 %     Lymphocyte Peritoneal 37 % currently available. Ceftolozane/tazobactam* >8        * This antibiotic result is an MICHAEL. No interpretation is currently available. Ceftazidime/avibactam* >16        * This antibiotic result is an MICHAEL. No interpretation is currently available. Voriconazole                            0.06 None   Itraconazole                            0.25 None   Fluconazole                             4 SDD   Amphotericin B                          0.25 None   Performed by Jhonatan Burnham 88,

## 2019-02-24 NOTE — PROGRESS NOTES
[facility]  Progress Note     Lee Goldsmith Patient Status:  Inpatient    1965 MRN [id number]   [facility] 4SW-A Attending Ruddy Landry MD   Hosp Day # 10 PCP Ny Burnette MD     Chief Complaint: intubated unable to obtain TP  6.5  6.4  6.2*   --        Estimated Creatinine Clearance: 23.5 mL/min (A) (based on SCr of 2.24 mg/dL (H)). No results for input(s): PTP, INR in the last 168 hours. No results for input(s): TROP, CK in the last 168 hours.          Imaging: Imag currently at 10cc/hr and residuals borderline. May need J tube? 14. Sacral/buttock, LLE stump, R ankle pressure sores POA  1.  Wound care, pictures on chart reviewed, has rectal tube     Quality:  · DVT ppx: heparin  · Code status: full    Estimated date o

## 2019-02-25 ENCOUNTER — APPOINTMENT (OUTPATIENT)
Dept: GENERAL RADIOLOGY | Facility: HOSPITAL | Age: 54
DRG: 003 | End: 2019-02-25
Attending: INTERNAL MEDICINE
Payer: MEDICARE

## 2019-02-25 LAB
ANION GAP SERPL CALC-SCNC: 9 MMOL/L (ref 0–18)
BUN BLD-MCNC: 17 MG/DL (ref 7–18)
BUN/CREAT SERPL: 6.1 (ref 10–20)
CALCIUM BLD-MCNC: 8.6 MG/DL (ref 8.5–10.1)
CHLORIDE SERPL-SCNC: 110 MMOL/L (ref 98–107)
CO2 SERPL-SCNC: 26 MMOL/L (ref 21–32)
CREAT BLD-MCNC: 2.77 MG/DL (ref 0.55–1.02)
DEPRECATED RDW RBC AUTO: 86.2 FL (ref 35.1–46.3)
ERYTHROCYTE [DISTWIDTH] IN BLOOD BY AUTOMATED COUNT: 23.1 % (ref 11–15)
GLUCOSE BLD-MCNC: 132 MG/DL (ref 70–99)
GLUCOSE BLD-MCNC: 147 MG/DL (ref 70–99)
GLUCOSE BLD-MCNC: 168 MG/DL (ref 70–99)
GLUCOSE BLD-MCNC: 173 MG/DL (ref 70–99)
GLUCOSE BLD-MCNC: 177 MG/DL (ref 70–99)
HAV IGM SER QL: 1.9 MG/DL (ref 1.6–2.6)
HCT VFR BLD AUTO: 28.1 % (ref 35–48)
HGB BLD-MCNC: 8.7 G/DL (ref 12–16)
MCH RBC QN AUTO: 32.1 PG (ref 26–34)
MCHC RBC AUTO-ENTMCNC: 31 G/DL (ref 31–37)
MCV RBC AUTO: 103.7 FL (ref 80–100)
OSMOLALITY SERPL CALC.SUM OF ELEC: 306 MOSM/KG (ref 275–295)
PHOSPHATE SERPL-MCNC: 3.6 MG/DL (ref 2.5–4.9)
PLATELET # BLD AUTO: 285 10(3)UL (ref 150–450)
POTASSIUM SERPL-SCNC: 4 MMOL/L (ref 3.5–5.1)
RBC # BLD AUTO: 2.71 X10(6)UL (ref 3.8–5.3)
SODIUM SERPL-SCNC: 145 MMOL/L (ref 136–145)
VALPROATE SERPL-MCNC: 39.8 UG/ML (ref 50–100)
WBC # BLD AUTO: 18.2 X10(3) UL (ref 4–11)

## 2019-02-25 PROCEDURE — 99233 SBSQ HOSP IP/OBS HIGH 50: CPT | Performed by: OTHER

## 2019-02-25 PROCEDURE — 99233 SBSQ HOSP IP/OBS HIGH 50: CPT | Performed by: INTERNAL MEDICINE

## 2019-02-25 PROCEDURE — 71045 X-RAY EXAM CHEST 1 VIEW: CPT | Performed by: INTERNAL MEDICINE

## 2019-02-25 PROCEDURE — 99232 SBSQ HOSP IP/OBS MODERATE 35: CPT | Performed by: HOSPITALIST

## 2019-02-25 RX ORDER — LEVETIRACETAM 500 MG/1
500 TABLET ORAL EVERY 12 HOURS
Status: DISCONTINUED | OUTPATIENT
Start: 2019-02-25 | End: 2019-02-26

## 2019-02-25 RX ORDER — METOCLOPRAMIDE HYDROCHLORIDE 5 MG/ML
10 INJECTION INTRAMUSCULAR; INTRAVENOUS EVERY 6 HOURS
Status: DISCONTINUED | OUTPATIENT
Start: 2019-02-25 | End: 2019-02-26

## 2019-02-25 NOTE — PALLIATIVE CARE NOTE
Chart reviewed and RN provided clinical update. Goals of care have been established with family requesting continued aggressive care with eventual trach once anticoagulants held.    Per GI, adjusting TF and monitoring residuals with possible EGD and G-J t

## 2019-02-25 NOTE — PROGRESS NOTES
Gastroenterology Progress Note  Patient Name: Eusebia Munroe  Chief Complaint: G-tube feed intolerance  S: The patient's g-tube feeds have been on hold for the past 1.5 days. No residuals currently. No vomiting. No abdominal distention.     O: /50 (BP infection, on treatment. Now on HD, and having developed g-tube feeding intolerance. She has been started on low dose Reglan. However, dosing less than 40 mg /day, will not produce motility benefit.   She does have underlying issues with new onset seizur

## 2019-02-25 NOTE — PROGRESS NOTES
[facility]                INFECTIOUS DISEASE PROGRESS NOTE    Sage Bentley Patient Status:  Inpatient    1965 MRN [id number]   [facility] 4SW-A Attending Eriberto Tinoco MD   Hosp Day # 6 PCP Chano Nichole MD     Antibiotics:an 3.5  4.0   CL  107  108*  107  108*  110*   CO2  27.0  27.0  27.0  25.0  26.0   ALKPHO  582*  503*  504*   --    --    AST  14*  12*  14*   --    --    ALT  15  13  13   --    --    BILT  0.4  0.4  0.4   --    --    TP  6.5  6.4  6.2*   --    --        No >8 Resistant       * Multiple drug resistant organism (MDRO)     Trimethoprim/Sulfa >80 Resistant      Colistin <=2 Sensitive      Tigecycline* <=2        * This antibiotic result is an MICHAEL. No interpretation is currently available.      Ceftolozane/tazobac Micafungin                              <=0.008 Suscept   Caspofungin                             0.06 Suscept   5-Fluorocytosine                        <=0.06 None   Posaconazole                            0.5 None   Voriconazole                         Lindsey Bryan MD  [facility]  [phone]

## 2019-02-25 NOTE — PLAN OF CARE
Diabetes/Glucose Control    • Glucose maintained within prescribed range Not Progressing        METABOLIC/FLUID AND ELECTROLYTES - ADULT    • Electrolytes maintained within normal limits Not Progressing        NEUROLOGICAL - ADULT    • Achieves stable or i

## 2019-02-25 NOTE — PROGRESS NOTES
[facility]  Progress Note     Lee Goldsmith Patient Status:  Inpatient    1965 MRN [id number]   [facility] 4SW-A Attending Ruddy Landry MD   Hosp Day # 6 PCP Ny Burnette MD     Chief Complaint: intubated unable to obtain Estimated Creatinine Clearance: 19 mL/min (A) (based on SCr of 2.77 mg/dL (H)). No results for input(s): PTP, INR in the last 168 hours. No results for input(s): TROP, CK in the last 168 hours. Imaging: Imaging data reviewed in Epic. stump, R ankle pressure sores POA  1. Wound care, pictures on chart reviewed, has rectal tube     Quality:  · DVT ppx: heparin  · Code status: full    Estimated date of discharge: once stable after trach - will need LTAC - CM/SW following    Case d/w RN.

## 2019-02-25 NOTE — PROGRESS NOTES
[facility]  Progress Note    Antonietta Barker Patient Status:  Inpatient    1965 MRN [id number]   [facility] 4SW-A Attending Mustapha Saxena MD   Hosp Day # 6 PCP Jose Alberto Kaba MD        Subjective:  Antonietta Barker is a(n) 52year old fem No edema, no cyanosis, s/p left BKA   Neurological: not opening eyes spontaneously, not tracking    Lab Data Review:  Recent Labs   Lab  02/21/19   0408  02/22/19   0504  02/23/19   0352  02/24/19   0510  02/25/19   0358   RBC  2.78*  2.80*  2.67*  2.72* end-stage renal disease with renal following.  On hemodialysis for the foreseeable future secondary to yeast peritonitis and removal of her intra-abdominal catheter.     #History of CVA/G-tube     #History of PAF on subcu heparin and Plavix      #History o

## 2019-02-25 NOTE — PROGRESS NOTES
[facility]    Progress Note    Phyllis Gaming Patient Status:  Inpatient    1965 MRN [id number]   [facility] 4SW-A Attending Ric Cisneros MD   Hosp Day # 6 PCP Jose Alberto Butler MD     Subjective:  Phyllis Gaming is a(n) 52year old fema BID. On the day of dialysis give the dose of Keppra after the dialysis is completed. Continue Depakote at same dose for now. Will check levels. Thank you for allowing us to participate in your patient's care.  Please do not hesitate to call if you have

## 2019-02-25 NOTE — DIETARY NOTE
NUTRITION Follow up  ASSESSMENT    Pt is at high nutrition risk. Pt does not meet malnutrition criteria.     NUTRITION DIAGNOSIS/PROBLEM:    Inadequate oral intake related to inability to consume sufficient energy as evidenced by the need for g tube feeding back per RN. Pt fed via g-tube   2/15 -Consult d/t pt with home g tube feedings    EVALUATION OF GOALS FROM PREVIOUS ASSESSMENT:      Patient response to care: Not met      Care Plan Effective? No      Revision of plan/interventions/goals needed:  Yes

## 2019-02-25 NOTE — PROGRESS NOTES
[facility]  Nephrology Progress Note    Karlie Layton Attending:  Adis Cabrera MD       Assessment and Plan:    1) ESRD- due to diabetes; PD cath removed due to Candida Glabrata in peritoneal fluid cx.  Will not resume PD at any point given FT and risk fo 02/25/2019    BUN 17 02/25/2019     02/25/2019    K 4.0 02/25/2019     02/25/2019    CO2 26.0 02/25/2019     02/25/2019    CA 8.6 02/25/2019    MG 1.9 02/25/2019    PHOS 3.6 02/25/2019    PGLU 168 02/25/2019       Imaging:   All imaging s Metoclopramide HCl (REGLAN) injection 5 mg 5 mg Intravenous Q8H PRN   acetaminophen (TYLENOL) tab 650 mg 650 mg Oral Q6H PRN   Or      acetaminophen (TYLENOL) 160 MG/5ML oral liquid 650 mg 650 mg Oral Q6H PRN   Or      acetaminophen (TYLENOL) 650 MG rect

## 2019-02-25 NOTE — CM/SW NOTE
[facility] liaison Elise here and advises pt would meet LTAC criteria for acceptance. Open beds at [facility] on day to day basis currently. [facility] liaison Maria Luisa Morrison advises pt would meet LTAC criteria for acceptance.  Per Maria Luisa Morrison, pt is out of medicare days so would b

## 2019-02-26 ENCOUNTER — ANESTHESIA EVENT (OUTPATIENT)
Dept: ENDOSCOPY | Facility: HOSPITAL | Age: 54
DRG: 003 | End: 2019-02-26
Payer: MEDICARE

## 2019-02-26 LAB
ANION GAP SERPL CALC-SCNC: 16 MMOL/L (ref 0–18)
BASOPHILS # BLD: 0.18 X10(3) UL (ref 0–0.2)
BASOPHILS NFR BLD: 1 %
BUN BLD-MCNC: 9 MG/DL (ref 7–18)
BUN/CREAT SERPL: 5.5 (ref 10–20)
CALCIUM BLD-MCNC: 8.2 MG/DL (ref 8.5–10.1)
CHLORIDE SERPL-SCNC: 103 MMOL/L (ref 98–107)
CO2 SERPL-SCNC: 23 MMOL/L (ref 21–32)
CREAT BLD-MCNC: 1.64 MG/DL (ref 0.55–1.02)
DEPRECATED RDW RBC AUTO: 87.7 FL (ref 35.1–46.3)
EOSINOPHIL # BLD: 0 X10(3) UL (ref 0–0.7)
EOSINOPHIL NFR BLD: 0 %
ERYTHROCYTE [DISTWIDTH] IN BLOOD BY AUTOMATED COUNT: 22.9 % (ref 11–15)
GLUCOSE BLD-MCNC: 153 MG/DL (ref 70–99)
GLUCOSE BLD-MCNC: 189 MG/DL (ref 70–99)
GLUCOSE BLD-MCNC: 197 MG/DL (ref 70–99)
GLUCOSE BLD-MCNC: 204 MG/DL (ref 70–99)
HCT VFR BLD AUTO: 28.4 % (ref 35–48)
HGB BLD-MCNC: 8.4 G/DL (ref 12–16)
LYMPHOCYTES NFR BLD: 1.63 X10(3) UL (ref 1–4)
LYMPHOCYTES NFR BLD: 9 %
MCH RBC QN AUTO: 31.7 PG (ref 26–34)
MCHC RBC AUTO-ENTMCNC: 29.6 G/DL (ref 31–37)
MCV RBC AUTO: 107.2 FL (ref 80–100)
MONOCYTES # BLD: 1.27 X10(3) UL (ref 0.1–1)
MONOCYTES NFR BLD: 7 %
MYELOCYTES # BLD: 0.18 X10(3) UL
MYELOCYTES NFR BLD: 1 %
NEUTROPHILS # BLD AUTO: 13 X10 (3) UL (ref 1.5–7.7)
NEUTROPHILS NFR BLD: 80 %
NEUTS BAND NFR BLD: 2 %
NEUTS HYPERSEG # BLD: 14.84 X10(3) UL (ref 1.5–7.7)
NRBC BLD MANUAL-RTO: 1 %
OSMOLALITY SERPL CALC.SUM OF ELEC: 299 MOSM/KG (ref 275–295)
PLATELET # BLD AUTO: 267 10(3)UL (ref 150–450)
POTASSIUM SERPL-SCNC: 3.6 MMOL/L (ref 3.5–5.1)
RBC # BLD AUTO: 2.65 X10(6)UL (ref 3.8–5.3)
SODIUM SERPL-SCNC: 142 MMOL/L (ref 136–145)
TOTAL CELLS COUNTED: 100
WBC # BLD AUTO: 18.1 X10(3) UL (ref 4–11)

## 2019-02-26 PROCEDURE — 99233 SBSQ HOSP IP/OBS HIGH 50: CPT | Performed by: NURSE PRACTITIONER

## 2019-02-26 PROCEDURE — 95822 EEG COMA OR SLEEP ONLY: CPT | Performed by: OTHER

## 2019-02-26 PROCEDURE — 99232 SBSQ HOSP IP/OBS MODERATE 35: CPT | Performed by: HOSPITALIST

## 2019-02-26 PROCEDURE — 99233 SBSQ HOSP IP/OBS HIGH 50: CPT | Performed by: INTERNAL MEDICINE

## 2019-02-26 RX ORDER — ASCORBIC ACID, THIAMINE, RIBOFLAVIN, NIACINAMIDE, PYRIDOXINE, FOLIC ACID, COBALAMIN, BIOTIN, PANTOTHENIC ACID 100; 1.5; 1.7; 20; 10; 1; 6; 300; 1 MG/1; MG/1; MG/1; MG/1; MG/1; MG/1; UG/1; UG/1; MG/1
1 TABLET, COATED ORAL DAILY
Status: DISCONTINUED | OUTPATIENT
Start: 2019-02-27 | End: 2019-03-09

## 2019-02-26 RX ORDER — METOCLOPRAMIDE HYDROCHLORIDE 5 MG/ML
10 INJECTION INTRAMUSCULAR; INTRAVENOUS EVERY 6 HOURS
Status: DISCONTINUED | OUTPATIENT
Start: 2019-02-26 | End: 2019-03-05

## 2019-02-26 RX ORDER — PANTOPRAZOLE SODIUM 40 MG/1
40 TABLET, DELAYED RELEASE ORAL
Status: DISCONTINUED | OUTPATIENT
Start: 2019-02-27 | End: 2019-02-26

## 2019-02-26 RX ORDER — CLOPIDOGREL BISULFATE 75 MG/1
75 TABLET ORAL DAILY
Status: DISCONTINUED | OUTPATIENT
Start: 2019-02-26 | End: 2019-02-26

## 2019-02-26 RX ORDER — FLUCONAZOLE 40 MG/ML
200 POWDER, FOR SUSPENSION ORAL DAILY
Status: DISPENSED | OUTPATIENT
Start: 2019-02-27 | End: 2019-03-03

## 2019-02-26 RX ORDER — METOCLOPRAMIDE HYDROCHLORIDE 5 MG/5ML
10 SOLUTION ORAL EVERY 6 HOURS
Status: DISCONTINUED | OUTPATIENT
Start: 2019-02-26 | End: 2019-02-26

## 2019-02-26 RX ORDER — IPRATROPIUM BROMIDE AND ALBUTEROL SULFATE 2.5; .5 MG/3ML; MG/3ML
3 SOLUTION RESPIRATORY (INHALATION)
Status: DISCONTINUED | OUTPATIENT
Start: 2019-02-26 | End: 2019-03-09

## 2019-02-26 RX ORDER — LEVOTHYROXINE SODIUM 0.12 MG/1
125 TABLET ORAL
Status: DISCONTINUED | OUTPATIENT
Start: 2019-02-27 | End: 2019-03-09

## 2019-02-26 RX ORDER — ACETAMINOPHEN 160 MG/5ML
650 SOLUTION ORAL EVERY 6 HOURS PRN
Status: DISCONTINUED | OUTPATIENT
Start: 2019-02-26 | End: 2019-03-09

## 2019-02-26 RX ORDER — LEVETIRACETAM 100 MG/ML
500 SOLUTION ORAL EVERY 12 HOURS
Status: DISCONTINUED | OUTPATIENT
Start: 2019-02-26 | End: 2019-03-09

## 2019-02-26 RX ORDER — ACETAMINOPHEN 650 MG/1
650 SUPPOSITORY RECTAL EVERY 6 HOURS PRN
Status: DISCONTINUED | OUTPATIENT
Start: 2019-02-26 | End: 2019-03-09

## 2019-02-26 RX ORDER — LEVETIRACETAM 500 MG/1
500 TABLET ORAL EVERY 12 HOURS
Status: DISCONTINUED | OUTPATIENT
Start: 2019-02-26 | End: 2019-02-26

## 2019-02-26 NOTE — PLAN OF CARE
NEUROLOGICAL - ADULT    • Achieves maximal functionality and self care Not Progressing        SKIN/TISSUE INTEGRITY - ADULT    • Skin integrity remains intact Not Progressing          NEUROLOGICAL - ADULT    • Achieves stable or improved neurological statu

## 2019-02-26 NOTE — PROGRESS NOTES
[facility]                INFECTIOUS DISEASE PROGRESS NOTE    Nancy Factor Patient Status:  Inpatient    1965 MRN [id number]   [facility] 4SW-A Attending Janie Oakley MD   Hosp Day # 12 PCP Charan Carreon MD     Antibiotics:an TP  6.5  6.4  6.2*   --    --    --        No results found for: [facility]  Microbiology  CELL COUNT/DIFF PERITONEAL FL Once [[id number]] Collected: 02/14/19 1410   Specimen:  Body fluid, unspecified Updated: 02/14/19 1535    Neutrophils Peritoneal 21 %     Ly interpretation is currently available. Ceftolozane/tazobactam* >8        * This antibiotic result is an MICHAEL. No interpretation is currently available. Ceftazidime/avibactam* >16        * This antibiotic result is an MICHAEL.  No interpretation is curren      0.5 None   Voriconazole                            0.06 None   Itraconazole                            0.25 None   Fluconazole                             4 SDD   Amphotericin B                          0.25 None   Performed by [facility],

## 2019-02-26 NOTE — ANESTHESIA PREPROCEDURE EVALUATION
PRE-OP EVALUATION    Patient Name: Matilda Sparks    Pre-op Diagnosis: Feeding difficulties [R63.3]    Procedure(s):  PERCUTANEOUS ENDOSCOPIC GASTROSTOMY PLACEMENT with jejunal extension tube     Surgeon(s) and Role:     * Magdaleno Almeida MD - Primary    Pre Once   heparin sodium 1000 UNIT/ML injection 3,000 Units 3,000 Units Intravenous PRN Dialysis   Insulin Aspart Pen (NOVOLOG) 100 UNIT/ML flexpen 1-15 Units 1-15 Units Subcutaneous 4 times per day   [COMPLETED] potassium chloride (K-SOL) 40 meq/30 ml (10%) mL 500 mL Intravenous Once   [COMPLETED] Piperacillin Sod-Tazobactam So (ZOSYN) 3.375 g in dextrose 5 % 100 mL ADD-vantage 3.375 g Intravenous Once   Heparin Sodium (Porcine) 5000 UNIT/ML injection 5,000 Units 5,000 Units Subcutaneous 2 times per day   ond ClonazePAM 0.5 MG Oral Tab 0.5 mg by Per G Tube route See Admin Instructions. Yelena Knight, Sat for anxiety 30 minutes before HD scheduled and bid prn  Disp:  Rfl:    ergocalciferol 18895 units Oral Cap 50,000 Units once a week.  PEG TUBE  Every Monday  Disp: Baclofen 5 MG Oral Tab Take 5 mg by mouth 2 (two) times daily. Disp:  Rfl:    insulin glargine (BASAGLAR KWIKPEN) 100 UNIT/ML Subcutaneous Solution Pen-injector Inject 25 Units into the skin nightly.  Disp:  Rfl:    bisacodyl (BISCOLAX) 10 MG Rectal Suppo without hematuria, site unspecified  Pressure injury of skin of sacral region, unspecified injury stage Sepsis ([facility] [address]  Seizure ([facility] [address] Status epilepticus ([facility] [address]  Myoclonus Peritonitis due to infected peritoneal dialysis catheter [facility])  Palliative care encounte

## 2019-02-26 NOTE — CM/SW NOTE
PEARL called out to son Liz Ruiz to get his sister Silvia's phone number. Liz Ruiz provided her number: [phone]. Kirti Terlton that since I could not locate any HCPOA document naming him as POA, I could do a surrogate HCPOA.   Liz Ruiz advises he visited his mother la

## 2019-02-26 NOTE — PLAN OF CARE
CARDIOVASCULAR - ADULT    • Maintains optimal cardiac output and hemodynamic stability Completed          GASTROINTESTINAL - ADULT    • Maintains adequate nutritional intake (undernourished) Not Progressing        RESPIRATORY - ADULT    • Achieves optimal

## 2019-02-26 NOTE — PROGRESS NOTES
[facility] Pharmacy Note: Route Optimization for Reglan     Patient is currently on metoclopramide (Reglan) 10 mg IV every 6 hours.  The patient meets the criteria to convert to the oral equivalent as established by the IV to Oral conversion protocol approved by t

## 2019-02-26 NOTE — PROGRESS NOTES
[facility]  Nephrology Progress Note    Linda Spicer Attending:  Wendy Ascencio MD       Assessment and Plan:    1) ESRD- due to diabetes; PD cath removed due to Candida Glabrata in peritoneal fluid cx.  Will not resume PD at any point given FT and risk fo 02/26/2019    BUN 9 02/26/2019     02/26/2019    K 3.6 02/26/2019     02/26/2019    CO2 23.0 02/26/2019     02/26/2019    CA 8.2 02/26/2019    PGLU 189 02/26/2019       Imaging: All imaging studies reviewed.     Meds:     Current Facilit suppository 10 mg 10 mg Rectal Daily PRN   Chlorhexidine Gluconate (PERIDEX) 0.12 % solution 15 mL 15 mL Mouth/Throat [email or web address]   Midazolam HCl (VERSED) 2 MG/2ML injection 2 mg 2 mg Intravenous Q5 Min PRN   Pantoprazole Sodium (PROTONIX) 40 mg in [place]

## 2019-02-26 NOTE — PLAN OF CARE
NEUROLOGICAL - ADULT    • Achieves stable or improved neurological status Progressing    • Absence of seizures Progressing    • Achieves maximal functionality and self care Progressing          RESPIRATORY - ADULT    • Achieves optimal ventilation and oxyg

## 2019-02-26 NOTE — PROGRESS NOTES
[facility] Pharmacy Note: Route Optimization for Pantoprazole (PROTONIX)    Patient is currently on pantoprazole (PROTONIX) 40 mg IV every 24 hours.    The patient meets the criteria to convert to the oral equivalent as established by the IV to Oral conversion pro

## 2019-02-26 NOTE — PROGRESS NOTES
[facility]  Progress Note     Tova Leone Patient Status:  Inpatient    1965 MRN [id number]   [facility] 4SW-A Attending Jesus Soto MD   Hosp Day # 15 PCP Jaci Espinoza MD     Chief Complaint: status epilepticus    S: Patien --        Estimated Creatinine Clearance: 32.1 mL/min (A) (based on SCr of 1.64 mg/dL (H)). No results for input(s): PTP, INR in the last 168 hours. No results for input(s): TROP, CK in the last 168 hours.          Imaging: Imaging data reviewed in POA  1.  Wound care, pictures on chart reviewed, has rectal tube     Quality:  · DVT ppx: heparin  · Code status: full                   Sánchez Meyer MD

## 2019-02-26 NOTE — PROGRESS NOTES
[id number] Neurology Progress Note    Jackie Palencia Patient Status:  Inpatient    1965 MRN [id number]   [facility] 4SW-A Attending Jessica Montez MD   Hosp Day # 12 PCP Madelaine Thompson MD         Subjective:  Jackie Palencia is a(n) 52 multivitamin  1 tablet Per NG Tube Daily   • Metoclopramide HCl  10 mg Per G Tube Q6H   • [START ON 2/27/2019] pantoprazole  40 mg Gastric Tube Before breakfast   • Insulin Aspart Pen  1-15 Units Subcutaneous 4 times per day   • Valproate Sodium  750 mg Pe

## 2019-02-26 NOTE — PROGRESS NOTES
[facility]  Progress Note    Jamey Bob Patient Status:  Inpatient    1965 MRN [id number]   [facility] 4SW-A Attending Hattie Jorgensen MD   Hosp Day # 15 PCP Jose Alberto Bowen MD        Subjective:  Jamey Bob is a(n) 52year old fem non-distended, no masses, no guarding, no   rebound, hypoactive BS.   Not tolerating tube feeds   Extremity: No edema, no cyanosis, s/p left BKA   Neurological: not opening eyes spontaneously, not tracking    Lab Data Review:  Recent Labs   Lab  02/22/19 trials.     #Severe stage IV sacral and stump wounds-off systemic antibiotics for this at present.     #History of end-stage renal disease with renal following.  On hemodialysis for the foreseeable future secondary to yeast peritonitis and removal of her i

## 2019-02-26 NOTE — PROGRESS NOTES
Gastroenterology Progress Note  Patient Name: Eugene Jimenez  Chief Complaint: G-tube feed intolerance  S: The patient has continued to not tolerate g-tube feeds, despite the increase in dosing of Reglan, and slow rate increase. No abdominal distention.     O:

## 2019-02-26 NOTE — PROCEDURES
ELECTROENCEPHALOGRAM REPORT      Patient Name: Phyllis Gaming  Chart ID: [id number]  Ordering Physician: No name on file.  Date of Test: 2/25/2019  Referring Physician:   Patient Diagnosis: Persistent encephalopathy    HISTORY  52year old female that presented

## 2019-02-27 ENCOUNTER — APPOINTMENT (OUTPATIENT)
Dept: GENERAL RADIOLOGY | Facility: HOSPITAL | Age: 54
DRG: 003 | End: 2019-02-27
Attending: NURSE PRACTITIONER
Payer: MEDICARE

## 2019-02-27 ENCOUNTER — ANESTHESIA (OUTPATIENT)
Dept: ENDOSCOPY | Facility: HOSPITAL | Age: 54
DRG: 003 | End: 2019-02-27
Payer: MEDICARE

## 2019-02-27 LAB
ANION GAP SERPL CALC-SCNC: 13 MMOL/L (ref 0–18)
BASOPHILS # BLD: 0 X10(3) UL (ref 0–0.2)
BASOPHILS NFR BLD: 0 %
BUN BLD-MCNC: 13 MG/DL (ref 7–18)
BUN/CREAT SERPL: 5.6 (ref 10–20)
CALCIUM BLD-MCNC: 8.3 MG/DL (ref 8.5–10.1)
CHLORIDE SERPL-SCNC: 105 MMOL/L (ref 98–107)
CO2 SERPL-SCNC: 24 MMOL/L (ref 21–32)
CREAT BLD-MCNC: 2.31 MG/DL (ref 0.55–1.02)
DEPRECATED RDW RBC AUTO: 87.3 FL (ref 35.1–46.3)
EOSINOPHIL # BLD: 0.16 X10(3) UL (ref 0–0.7)
EOSINOPHIL NFR BLD: 1 %
ERYTHROCYTE [DISTWIDTH] IN BLOOD BY AUTOMATED COUNT: 23.1 % (ref 11–15)
GLUCOSE BLD-MCNC: 101 MG/DL (ref 70–99)
GLUCOSE BLD-MCNC: 173 MG/DL (ref 70–99)
GLUCOSE BLD-MCNC: 177 MG/DL (ref 70–99)
GLUCOSE BLD-MCNC: 210 MG/DL (ref 70–99)
GLUCOSE BLD-MCNC: 211 MG/DL (ref 70–99)
GLUCOSE BLD-MCNC: 233 MG/DL (ref 70–99)
HCT VFR BLD AUTO: 26.3 % (ref 35–48)
HGB BLD-MCNC: 7.9 G/DL (ref 12–16)
INR BLD: 1.44 (ref 0.9–1.1)
LYMPHOCYTES NFR BLD: 1.71 X10(3) UL (ref 1–4)
LYMPHOCYTES NFR BLD: 11 %
MCH RBC QN AUTO: 31.9 PG (ref 26–34)
MCHC RBC AUTO-ENTMCNC: 30 G/DL (ref 31–37)
MCV RBC AUTO: 106 FL (ref 80–100)
MONOCYTES # BLD: 1.09 X10(3) UL (ref 0.1–1)
MONOCYTES NFR BLD: 7 %
NEUTROPHILS # BLD AUTO: 11.39 X10 (3) UL (ref 1.5–7.7)
NEUTROPHILS NFR BLD: 78 %
NEUTS BAND NFR BLD: 3 %
NEUTS HYPERSEG # BLD: 12.56 X10(3) UL (ref 1.5–7.7)
NRBC BLD MANUAL-RTO: 2 %
OSMOLALITY SERPL CALC.SUM OF ELEC: 298 MOSM/KG (ref 275–295)
PLATELET # BLD AUTO: 256 10(3)UL (ref 150–450)
PLATELET MORPHOLOGY: NORMAL
POTASSIUM SERPL-SCNC: 3.5 MMOL/L (ref 3.5–5.1)
PSA SERPL DL<=0.01 NG/ML-MCNC: 18.3 SECONDS (ref 12.5–14.7)
RBC # BLD AUTO: 2.48 X10(6)UL (ref 3.8–5.3)
SODIUM SERPL-SCNC: 142 MMOL/L (ref 136–145)
TOTAL CELLS COUNTED: 100
WBC # BLD AUTO: 15.5 X10(3) UL (ref 4–11)

## 2019-02-27 PROCEDURE — 99233 SBSQ HOSP IP/OBS HIGH 50: CPT | Performed by: INTERNAL MEDICINE

## 2019-02-27 PROCEDURE — 74018 RADEX ABDOMEN 1 VIEW: CPT | Performed by: NURSE PRACTITIONER

## 2019-02-27 PROCEDURE — 99233 SBSQ HOSP IP/OBS HIGH 50: CPT | Performed by: OTHER

## 2019-02-27 PROCEDURE — 0D20XUZ CHANGE FEEDING DEVICE IN UPPER INTESTINAL TRACT, EXTERNAL APPROACH: ICD-10-PCS | Performed by: INTERNAL MEDICINE

## 2019-02-27 PROCEDURE — 99232 SBSQ HOSP IP/OBS MODERATE 35: CPT | Performed by: HOSPITALIST

## 2019-02-27 RX ORDER — ALBUMIN (HUMAN) 12.5 G/50ML
SOLUTION INTRAVENOUS
Status: COMPLETED
Start: 2019-02-27 | End: 2019-02-27

## 2019-02-27 RX ORDER — ALBUMIN (HUMAN) 12.5 G/50ML
25 SOLUTION INTRAVENOUS
Status: DISCONTINUED | OUTPATIENT
Start: 2019-02-27 | End: 2019-03-03

## 2019-02-27 NOTE — ANESTHESIA POSTPROCEDURE EVALUATION
[facility] Status:  [facility]   Age/Gender 52year old female MRN [id number]   [facility] 4SW-A Attending Elyssa Bolanos MD   Hosp Day # 15 PCP Baldemar Kessler MD       Anesthesia Post-op Note    Procedure(s):  Push en

## 2019-02-27 NOTE — PROGRESS NOTES
[facility]                INFECTIOUS DISEASE PROGRESS NOTE    Lizet Villavicencio Patient Status:  Inpatient    1965 MRN [id number]   [facility] 4SW-A Attending Ros Victor MD   Hosp Day # 15 PCP Dariel Kevin MD     Antibiotics:an --    --    --    ALT  15  13  13   --    --    --    --    BILT  0.4  0.4  0.4   --    --    --    --    TP  6.5  6.4  6.2*   --    --    --    --     < > = values in this interval not displayed.        No results found for: [facility]  Microbiology  CELL COU organism (MDRO)     Trimethoprim/Sulfa >80 Resistant      Colistin <=2 Sensitive      Tigecycline* <=2        * This antibiotic result is an MICHAEL. No interpretation is currently available.      Ceftolozane/tazobactam* >8        * This antibiotic result is an Suscept   Caspofungin                             0.06 Suscept   5-Fluorocytosine                        <=0.06 None   Posaconazole                            0.5 None   Voriconazole                            0.06 None   Itraconazole                       Consultants  [phone]

## 2019-02-27 NOTE — OPERATIVE REPORT
EGD operative report  Patient Name: Phyllis Gaming  Procedure: Esophagogastroduodenoscopy with push enteroscopy and placement of gastrojejunal feeding tube  Date of procedure: 2/27/2019    Indication: Feeding difficulty  Attending: Martin Alegria M.D.   Cons gastric body, antrum, fundus, cardia, and angularis were normal, without masses, polyps, ulcers, erosions, diverticula, or varices.      Duodenum: The duodenal bulb, post-bulbar duodenum, and descending duodenum were normal, without masses, polyps, ulcers,

## 2019-02-27 NOTE — PROGRESS NOTES
[facility]  Nephrology Progress Note    Jaleesa Babb Attending:  Ashley Posada MD       Assessment and Plan:    1) ESRD- due to diabetes; PD cath removed due to Candida Glabrata in peritoneal fluid cx.  Will not resume PD at any point given FT and risk fo 142 02/27/2019    K 3.5 02/27/2019     02/27/2019    CO2 24.0 02/27/2019     02/27/2019    CA 8.3 02/27/2019    INR 1.44 02/27/2019    PTP 18.3 02/27/2019    PGLU 173 02/27/2019       Imaging: All imaging studies reviewed.     Meds:     Marcie Dias HCl (ZOFRAN) injection 4 mg 4 mg Intravenous Q6H PRN   PEG 3350 (MIRALAX) powder packet 17 g 17 g Oral Daily PRN   bisacodyl (DULCOLAX) rectal suppository 10 mg 10 mg Rectal Daily PRN   Chlorhexidine Gluconate (PERIDEX) 0.12 % solution 15 mL 15 mL Mouth/Th

## 2019-02-27 NOTE — CM/SW NOTE
Plan for change of G tube to GJ tube today per GI. Off plavix for trach 3/6. Ongoing vent weaning with PS trials. HD ongoing. Sent ECIN updates to [facility] and [facility]. Plan: Follow with HCPOA urszula Annie Jimenez for LTAC choice.   Jaspreet Recinos

## 2019-02-27 NOTE — PROGRESS NOTES
[facility]  Progress Note     Creasie Many Patient Status:  Inpatient    1965 MRN [id number]   [facility] 4SW-A Attending Christina Fallon MD   Hosp Day # 15 PCP Kwasi Tinsley MD     Chief Complaint: unresponsive    S: Patient is i 23.0  24.0   ALKPHO  582*  503*  504*   --    --    --    --    AST  14*  12*  14*   --    --    --    --    ALT  15  13  13   --    --    --    --    BILT  0.4  0.4  0.4   --    --    --    --    TP  6.5  6.4  6.2*   --    --    --    --     < > = values 12. MDRO/XDRO  13. Difficulty tolerating TF  1.  for G-J extension today  14. Sacral/buttock, LLE stump, R ankle pressure sores POA  1.  Wound care, pictures on chart reviewed, has rectal tube     Quality:  · DVT ppx: heparin  · Code status: full

## 2019-02-27 NOTE — PROGRESS NOTES
[id number] Neurology Progress Note    Darin Rizo Patient Status:  Inpatient    1965 MRN [id number]   [facility] 4SW-A Attending Génesis rOozco MD   Hosp Day # 15 PCP Grace Hernandez MD       Neurology Attending note     I have Limited exam   Intubated, eyes open does not track or follow commands, weak cough/gag per nursing, pupils 2 mm non reactive, right side withdrawals to painful stimuli, some spontaneous movement of right arm noted, Left AKA, left arm plegic , Grimaces to Peritonitis due to infected peritoneal dialysis catheter [facility])     Palliative care encounter     Goals of care, counseling/discussion     Peritonitis ([facility] [address]     Respiratory failure ([facility] [address]     Anemia in chronic kidney disease, on chronic dialysis ([facility] [address]    Ass

## 2019-02-27 NOTE — PLAN OF CARE
METABOLIC/FLUID AND ELECTROLYTES - ADULT    • Glucose maintained within prescribed range Progressing    • Electrolytes maintained within normal limits Progressing    • Hemodynamic stability and optimal renal function maintained Progressing          NEUROLO

## 2019-02-27 NOTE — PLAN OF CARE
Patient unresponsive. Open eyes but does not track or follow commands. Pupils sluggish. G- Tube removed  and G-J tubed placed today. Dressing on buttocks changed with wet- dry dressing. HD today after procedure.   Called and notified Melinda jaffe

## 2019-02-27 NOTE — PROGRESS NOTES
[facility]  Progress Note    John Askew Patient Status:  Inpatient    1965 MRN [id number]   [facility] 4SW-A Attending Lonnie Cortez MD   Hosp Day # 15 PCP Jose Alberto Rodgers MD        Subjective:  John Askew is a(n) 52year old fem opening eyes spontaneously, not tracking    Lab Data Review:  Recent Labs   Lab  02/23/19   0352   02/25/19   0358  02/26/19   0453  02/27/19   0515   RBC  2.67*   < >  2.71*  2.65*  2.48*   HGB  8.3*   < >  8.7*  8.4*  7.9*   HCT  28.1*   < >  28.1*  28.4 following.  On hemodialysis for the foreseeable future secondary to yeast peritonitis and removal of her intra-abdominal catheter.     #History of CVA/G-tube     #History of PAF on subcu heparin and Plavix      #History of MI/ischemic cardiomyopathy last E

## 2019-02-28 ENCOUNTER — APPOINTMENT (OUTPATIENT)
Dept: GENERAL RADIOLOGY | Facility: HOSPITAL | Age: 54
DRG: 003 | End: 2019-02-28
Attending: INTERNAL MEDICINE
Payer: MEDICARE

## 2019-02-28 LAB
ANION GAP SERPL CALC-SCNC: 12 MMOL/L (ref 0–18)
BASOPHILS # BLD: 0 X10(3) UL (ref 0–0.2)
BASOPHILS NFR BLD: 0 %
BUN BLD-MCNC: 6 MG/DL (ref 7–18)
BUN/CREAT SERPL: 4.7 (ref 10–20)
CALCIUM BLD-MCNC: 8.7 MG/DL (ref 8.5–10.1)
CHLORIDE SERPL-SCNC: 103 MMOL/L (ref 98–107)
CO2 SERPL-SCNC: 25 MMOL/L (ref 21–32)
CREAT BLD-MCNC: 1.29 MG/DL (ref 0.55–1.02)
DEPRECATED RDW RBC AUTO: 86.8 FL (ref 35.1–46.3)
EOSINOPHIL # BLD: 0 X10(3) UL (ref 0–0.7)
EOSINOPHIL NFR BLD: 0 %
ERYTHROCYTE [DISTWIDTH] IN BLOOD BY AUTOMATED COUNT: 22.9 % (ref 11–15)
GLUCOSE BLD-MCNC: 134 MG/DL (ref 70–99)
GLUCOSE BLD-MCNC: 136 MG/DL (ref 70–99)
GLUCOSE BLD-MCNC: 172 MG/DL (ref 70–99)
GLUCOSE BLD-MCNC: 194 MG/DL (ref 70–99)
HAV IGM SER QL: 1.7 MG/DL (ref 1.6–2.6)
HCT VFR BLD AUTO: 26 % (ref 35–48)
HGB BLD-MCNC: 7.9 G/DL (ref 12–16)
LYMPHOCYTES NFR BLD: 0.7 X10(3) UL (ref 1–4)
LYMPHOCYTES NFR BLD: 3 %
MCH RBC QN AUTO: 32.2 PG (ref 26–34)
MCHC RBC AUTO-ENTMCNC: 30.4 G/DL (ref 31–37)
MCV RBC AUTO: 106.1 FL (ref 80–100)
MONOCYTES # BLD: 2.34 X10(3) UL (ref 0.1–1)
MONOCYTES NFR BLD: 10 %
NEUTROPHILS # BLD AUTO: 18.69 X10 (3) UL (ref 1.5–7.7)
NEUTROPHILS NFR BLD: 86 %
NEUTS BAND NFR BLD: 1 %
NEUTS HYPERSEG # BLD: 20.36 X10(3) UL (ref 1.5–7.7)
OSMOLALITY SERPL CALC.SUM OF ELEC: 290 MOSM/KG (ref 275–295)
PLATELET # BLD AUTO: 276 10(3)UL (ref 150–450)
PLATELET MORPHOLOGY: NORMAL
POTASSIUM SERPL-SCNC: 3.4 MMOL/L (ref 3.5–5.1)
RBC # BLD AUTO: 2.45 X10(6)UL (ref 3.8–5.3)
SODIUM SERPL-SCNC: 140 MMOL/L (ref 136–145)
TOTAL CELLS COUNTED: 100
WBC # BLD AUTO: 23.4 X10(3) UL (ref 4–11)

## 2019-02-28 PROCEDURE — 99233 SBSQ HOSP IP/OBS HIGH 50: CPT | Performed by: INTERNAL MEDICINE

## 2019-02-28 PROCEDURE — 99232 SBSQ HOSP IP/OBS MODERATE 35: CPT | Performed by: HOSPITALIST

## 2019-02-28 PROCEDURE — 71045 X-RAY EXAM CHEST 1 VIEW: CPT | Performed by: INTERNAL MEDICINE

## 2019-02-28 RX ORDER — MINERAL OIL AND PETROLATUM 150; 830 MG/G; MG/G
OINTMENT OPHTHALMIC 3 TIMES DAILY
Status: DISCONTINUED | OUTPATIENT
Start: 2019-02-28 | End: 2019-03-09

## 2019-02-28 RX ORDER — POTASSIUM CHLORIDE 20 MEQ/1
20 TABLET, EXTENDED RELEASE ORAL ONCE
Status: COMPLETED | OUTPATIENT
Start: 2019-02-28 | End: 2019-02-28

## 2019-02-28 NOTE — PLAN OF CARE
CARDIOVASCULAR - ADULT    • Absence of cardiac arrhythmias or at baseline Adequate for Discharge        Delirium    • Minimize duration of delirium Adequate for Discharge          NEUROLOGICAL - ADULT    • Absence of seizures Renan Hilario

## 2019-02-28 NOTE — PROGRESS NOTES
[facility] Pharmacy Note: Route Optimization for Pantoprazole (PROTONIX)    Patient is currently on Pantoprazole (PROTONIX) 40 mg IV every 24 hours.    The patient meets the criteria to convert to the oral equivalent as established by the IV to Oral conversion pro

## 2019-02-28 NOTE — PROGRESS NOTES
[facility]  Nephrology Progress Note    Delma Russell Attending:  Imelda Gabriel MD       Assessment and Plan:    1) ESRD- due to diabetes; PD cath removed due to Candida Glabrata in peritoneal fluid cx.  Will not resume PD at any point given FT and risk fo 02/28/2019    K 3.4 02/28/2019     02/28/2019    CO2 25.0 02/28/2019     02/28/2019    CA 8.7 02/28/2019    MG 1.7 02/28/2019    PGLU 134 02/28/2019       Imaging: All imaging studies reviewed.     Meds:     Current Facility-Administered Medic [facility] IRINA [facility]) injection 4 mg 4 mg Intravenous Q6H PRN   PEG 3350 (MIRALAX) powder packet 17 g 17 g Oral Daily PRN   bisacodyl (DULCOLAX) rectal suppository 10 mg 10 mg Rectal Daily PRN   Chlorhexidine Gluconate (PERIDEX) 0.12 % solution 15 mL 15 mL Mouth/Throat

## 2019-02-28 NOTE — PLAN OF CARE
Patient is unresponsive. Open eyes to pain Does not follow commands, Pupils non reactive and does not track. Weak gag/cough. Remains intubated and tolerating vent support with moderate ETT secretions. Dressing changed on wounds. Tube feeds started today.

## 2019-02-28 NOTE — DIETARY NOTE
NUTRITION FOLLOW-UP ASSESSMENT    Pt is at high nutrition risk. Pt does not meet malnutrition criteria.     NUTRITION DIAGNOSIS/PROBLEM:    Inadequate oral intake related to inability to consume sufficient energy as evidenced by the need for gj tube feeding abdominal discomfort, pain or nausea. Pt with multiple non healing wounds to back per RN.   Pt fed via g-tube   2/15 -Consult d/t pt with home g tube feedings    EVALUATION OF GOALS FROM PREVIOUS ASSESSMENT:      Patient response to care: Not met      Care

## 2019-02-28 NOTE — PROGRESS NOTES
[facility]                INFECTIOUS DISEASE PROGRESS NOTE    Krupa Bell Patient Status:  Inpatient    1965 MRN [id number]   [facility] 4SW-A Attending Raven Cabrera MD   Hosp Day # 15 PCP Meagan Shane MD     Antibiotics:an --    AST  12*  14*   --    --    --    --    ALT  13  13   --    --    --    --    BILT  0.4  0.4   --    --    --    --    TP  6.4  6.2*   --    --    --    --     < > = values in this interval not displayed.        No results found for: VANCT  Microbiol resistant organism (MDRO)     Trimethoprim/Sulfa >80 Resistant      Colistin <=2 Sensitive      Tigecycline* <=2        * This antibiotic result is an MICHAEL. No interpretation is currently available.      Ceftolozane/tazobactam* >8        * This antibiotic re  <=0.008 Suscept   Caspofungin                             0.06 Suscept   5-Fluorocytosine                        <=0.06 None   Posaconazole                            0.5 None   Voriconazole                            0.06 None   Itraconazole             Consultants  [phone]

## 2019-02-28 NOTE — PROGRESS NOTES
[facility]  Progress Note    Sapphire Cuevas Patient Status:  Inpatient    1965 MRN [id number]   [facility] 4SW-A Attending Brandon Call MD   Hosp Day # 15 PCP Jose Alberto Harrell MD     Subjective:  Sapphire Cuevas is a(n) 52year old female l disease) [facility]     H/O: CVA (cerebrovascular accident)     Hypothyroidism     Skin ulcer of sacrum (HCC)     Altered mental status, unspecified altered mental status type     Urinary tract infection without hematuria, site unspecified     Pressure injury o

## 2019-02-28 NOTE — PROGRESS NOTES
[facility]  Progress Note     Phyllis Gamign Patient Status:  Inpatient    1965 MRN [id number]   [facility] 4SW-A Attending Ric Cisneros MD   Hosp Day # 15 PCP Patricia Valdez MD     Chief Complaint: unresponsive    S: Patient stil 27.0   < >  23.0  24.0  25.0   ALKPHO  503*  504*   --    --    --    --    AST  12*  14*   --    --    --    --    ALT  13  13   --    --    --    --    BILT  0.4  0.4   --    --    --    --    TP  6.4  6.2*   --    --    --    --     < > = values in this CVA with left sided weakness   12. Difficulty tolerating TF s/p GJ tube extension yesterday  13. Sacral/buttock, LLE stump, R ankle pressure sores POA  1.  Wound care, pictures on chart reviewed, has rectal tube     Quality:  · DVT ppx: heparin  · Code stat

## 2019-02-28 NOTE — PROGRESS NOTES
Gastroenterology Progress Note  Patient Name: Lee Goldsmith  Chief Complaint: G-tube intolerance  S: The patient is s/p jejunal extension tube placed yesterday, due to intolerance of g-tube feeding. No abdominal pain.    O: /48   Pulse 92   Temp 99.7 °F at this time.          FINDINGS:  J tube tip projecting in the area of the proximal jejunum.  No free air.  Nonobstructive bowel gas pattern.  No suspicious calcifications.  Vascular calcifications.      Impression:     CONCLUSION:  J-tube tip projecting i

## 2019-03-01 ENCOUNTER — APPOINTMENT (OUTPATIENT)
Dept: GENERAL RADIOLOGY | Facility: HOSPITAL | Age: 54
DRG: 003 | End: 2019-03-01
Attending: INTERNAL MEDICINE
Payer: MEDICARE

## 2019-03-01 LAB
ALLENS TEST: POSITIVE
ANION GAP SERPL CALC-SCNC: 9 MMOL/L (ref 0–18)
ARTERIAL BLD GAS O2 SATURATION: 96 % (ref 92–100)
ARTERIAL BLOOD GAS BASE EXCESS: 0.5 MMOL/L (ref ?–2)
ARTERIAL BLOOD GAS HCO3: 25.8 MEQ/L (ref 22–26)
ARTERIAL BLOOD GAS PCO2: 45 MM HG (ref 35–45)
ARTERIAL BLOOD GAS PH: 7.37 (ref 7.35–7.45)
ARTERIAL BLOOD GAS PO2: 125 MM HG (ref 80–105)
ATRIAL RATE: 99 BPM
BASOPHILS # BLD: 0 X10(3) UL (ref 0–0.2)
BASOPHILS NFR BLD: 0 %
BUN BLD-MCNC: 11 MG/DL (ref 7–18)
BUN/CREAT SERPL: 5 (ref 10–20)
CALCIUM BLD-MCNC: 8.8 MG/DL (ref 8.5–10.1)
CALCULATED O2 SATURATION: 99 % (ref 92–100)
CARBOXYHEMOGLOBIN: 1.7 % SAT (ref 0–3)
CHLORIDE SERPL-SCNC: 102 MMOL/L (ref 98–107)
CO2 SERPL-SCNC: 27 MMOL/L (ref 21–32)
CREAT BLD-MCNC: 2.22 MG/DL (ref 0.55–1.02)
DEPRECATED RDW RBC AUTO: 87.6 FL (ref 35.1–46.3)
EOSINOPHIL # BLD: 0.19 X10(3) UL (ref 0–0.7)
EOSINOPHIL NFR BLD: 1 %
ERYTHROCYTE [DISTWIDTH] IN BLOOD BY AUTOMATED COUNT: 23.1 % (ref 11–15)
FIO2: 30 %
GLUCOSE BLD-MCNC: 217 MG/DL (ref 70–99)
GLUCOSE BLD-MCNC: 267 MG/DL (ref 70–99)
GLUCOSE BLD-MCNC: 285 MG/DL (ref 70–99)
GLUCOSE BLD-MCNC: 311 MG/DL (ref 70–99)
GLUCOSE BLD-MCNC: 318 MG/DL (ref 70–99)
HAV IGM SER QL: 1.8 MG/DL (ref 1.6–2.6)
HCT VFR BLD AUTO: 25.3 % (ref 35–48)
HGB BLD-MCNC: 7.7 G/DL (ref 12–16)
LYMPHOCYTES NFR BLD: 13 %
LYMPHOCYTES NFR BLD: 2.47 X10(3) UL (ref 1–4)
MCH RBC QN AUTO: 32.1 PG (ref 26–34)
MCHC RBC AUTO-ENTMCNC: 30.4 G/DL (ref 31–37)
MCV RBC AUTO: 105.4 FL (ref 80–100)
METAMYELOCYTES # BLD: 0.19 X10(3) UL
METAMYELOCYTES NFR BLD: 1 %
METHEMOGLOBIN: 0.6 % SAT (ref 0.4–1.5)
MONOCYTES # BLD: 0.76 X10(3) UL (ref 0.1–1)
MONOCYTES NFR BLD: 4 %
NEUTROPHILS # BLD AUTO: 14.26 X10 (3) UL (ref 1.5–7.7)
NEUTROPHILS NFR BLD: 72 %
NEUTS BAND NFR BLD: 9 %
NEUTS HYPERSEG # BLD: 15.39 X10(3) UL (ref 1.5–7.7)
OSMOLALITY SERPL CALC.SUM OF ELEC: 296 MOSM/KG (ref 275–295)
P AXIS: 39 DEGREES
P-R INTERVAL: 132 MS
PATIENT TEMPERATURE: 98.8 F
PEEP: 5 CM H2O
PHOSPHATE SERPL-MCNC: 2.7 MG/DL (ref 2.5–4.9)
PLATELET # BLD AUTO: 273 10(3)UL (ref 150–450)
PLATELET MORPHOLOGY: NORMAL
POTASSIUM SERPL-SCNC: 3.3 MMOL/L (ref 3.5–5.1)
Q-T INTERVAL: 336 MS
QRS DURATION: 84 MS
QTC CALCULATION (BEZET): 431 MS
R AXIS: 78 DEGREES
RBC # BLD AUTO: 2.4 X10(6)UL (ref 3.8–5.3)
SODIUM SERPL-SCNC: 138 MMOL/L (ref 136–145)
T AXIS: 114 DEGREES
TIDAL VOLUME: 325 ML
TOTAL CELLS COUNTED: 100
TOTAL HEMOGLOBIN: 8 G/DL (ref 11.7–16)
VENT RATE: 12 /MIN
VENTRICULAR RATE: 99 BPM
WBC # BLD AUTO: 19 X10(3) UL (ref 4–11)

## 2019-03-01 PROCEDURE — 99232 SBSQ HOSP IP/OBS MODERATE 35: CPT | Performed by: HOSPITALIST

## 2019-03-01 PROCEDURE — 71045 X-RAY EXAM CHEST 1 VIEW: CPT | Performed by: NURSE PRACTITIONER

## 2019-03-01 PROCEDURE — 99233 SBSQ HOSP IP/OBS HIGH 50: CPT | Performed by: INTERNAL MEDICINE

## 2019-03-01 RX ORDER — ARIPIPRAZOLE 15 MG/1
20 TABLET ORAL DAILY
Status: DISCONTINUED | OUTPATIENT
Start: 2019-03-01 | End: 2019-03-05

## 2019-03-01 NOTE — CM/SW NOTE
Called to POA son Kimi Thomas to make aware pt can be accepted to [facility] or [facility]. Kimi Thomas advises he and his siblings plan to tour both LTACs over weekend.     Made Kimi Thomas aware that pt anticipated to discharge to LTAC approx 24-48 hrs after tr

## 2019-03-01 NOTE — PROGRESS NOTES
Gastroenterology Progress Note  Patient Name: Jackie Palencia  Chief Complaint: tube feed intolerance  S: The patient has been receiving jejunal tube feeds, and is at 50cc/hr, and appears to be tolerating. No abdominal distention or pain.     O: /55   Pul

## 2019-03-01 NOTE — PROGRESS NOTES
[facility]  Progress Note     Lizetmichael Villavicencio Patient Status:  Inpatient    1965 MRN [id number]   [facility] 4SW-A Attending Ros Victor MD   Hosp Day # 13 PCP Dariel Kevin MD     Chief Complaint: unresponsive    S: Patient is o values in this interval not displayed. Estimated Creatinine Clearance: 23.7 mL/min (A) (based on SCr of 2.22 mg/dL (H)). Recent Labs   Lab  02/27/19   0515   PTP  18.3*   INR  1.44*       No results for input(s): TROP, CK in the last 168 hours. heparin  · Code status: full        Reina Ayoub MD

## 2019-03-01 NOTE — PROGRESS NOTES
[facility]  Nephrology Progress Note    Lee Goldsmith Attending:  Sue Mujica MD       Assessment and Plan:    1) ESRD- due to diabetes; PD cath removed due to Candida Glabrata in peritoneal fluid cx.  Will not resume PD at any point given FT and risk fo 03/01/2019    CO2 27.0 03/01/2019     03/01/2019    CA 8.8 03/01/2019    MG 1.8 03/01/2019    PHOS 2.7 03/01/2019    PGLU 267 03/01/2019       Imaging: All imaging studies reviewed.     Meds:     Current Facility-Administered Medications:  artificia Units 5,000 Units Subcutaneous 2 times per day   ondansetron HCl (ZOFRAN) injection 4 mg 4 mg Intravenous Q6H PRN   PEG 3350 (MIRALAX) powder packet 17 g 17 g Oral Daily PRN   bisacodyl (DULCOLAX) rectal suppository 10 mg 10 mg Rectal Daily PRN   Chlorhexi

## 2019-03-01 NOTE — PLAN OF CARE
RESPIRATORY - ADULT    • Achieves optimal ventilation and oxygenation Progressing          SKIN/TISSUE INTEGRITY - ADULT    • Skin integrity remains intact Progressing    • Incision(s), wounds(s) or drain site(s) healing without S/S of infection Progressin

## 2019-03-01 NOTE — PROGRESS NOTES
[facility]  Progress Note    Matilda Sparks Patient Status:  Inpatient    1965 MRN [id number]   [facility] 4SW-A Attending Chetan Conte MD   Hosp Day # 13 PCP Jose Alberto Lopez MD        Subjective:  Matilda Sparks is a(n) 52year old fem spontaneously, not tracking    Lab Data Review:  Recent Labs   Lab  02/27/19   0515  02/28/19   0455  03/01/19   0558   RBC  2.48*  2.45*  2.40*   HGB  7.9*  7.9*  7.7*   HCT  26.3*  26.0*  25.3*   MCV  106.0*  106.1*  105.4*   MCH  31.9  32.2  32.1   MCHC    #History of MI/ischemic cardiomyopathy last EF on record 9/17 of 40% with normal RV     #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

## 2019-03-01 NOTE — PROGRESS NOTES
[facility]                INFECTIOUS DISEASE PROGRESS NOTE    Darin Rizo Patient Status:  Inpatient    1965 MRN [id number]   [facility] 4SW-A Attending Génesis Orozco MD   Hosp Day # 13 PCP Grace Hernandez MD     Antibiotics:an 25.0  27.0   ALKPHO  504*   --    --    --    --    AST  14*   --    --    --    --    ALT  13   --    --    --    --    BILT  0.4   --    --    --    --    TP  6.2*   --    --    --    --     < > = values in this interval not displayed.        No results f Resistant       * Multiple drug resistant organism (MDRO)     Trimethoprim/Sulfa >80 Resistant      Colistin <=2 Sensitive      Tigecycline* <=2        * This antibiotic result is an MICHAEL. No interpretation is currently available.      Ceftolozane/tazobactam                              <=0.008 Suscept   Caspofungin                             0.06 Suscept   5-Fluorocytosine                        <=0.06 None   Posaconazole                            0.5 None   Voriconazole                            0.06 None peripherally please reconsult PRN    Jonna Almanzar MD  [facility]  [phone]

## 2019-03-02 ENCOUNTER — APPOINTMENT (OUTPATIENT)
Dept: GENERAL RADIOLOGY | Facility: HOSPITAL | Age: 54
DRG: 003 | End: 2019-03-02
Attending: INTERNAL MEDICINE
Payer: MEDICARE

## 2019-03-02 LAB
ANION GAP SERPL CALC-SCNC: 8 MMOL/L (ref 0–18)
BASOPHILS # BLD: 0 X10(3) UL (ref 0–0.2)
BASOPHILS NFR BLD: 0 %
BUN BLD-MCNC: 10 MG/DL (ref 7–18)
BUN/CREAT SERPL: 6.1 (ref 10–20)
CALCIUM BLD-MCNC: 8.1 MG/DL (ref 8.5–10.1)
CHLORIDE SERPL-SCNC: 103 MMOL/L (ref 98–107)
CO2 SERPL-SCNC: 29 MMOL/L (ref 21–32)
CREAT BLD-MCNC: 1.64 MG/DL (ref 0.55–1.02)
DEPRECATED RDW RBC AUTO: 90.1 FL (ref 35.1–46.3)
EOSINOPHIL # BLD: 0 X10(3) UL (ref 0–0.7)
EOSINOPHIL NFR BLD: 0 %
ERYTHROCYTE [DISTWIDTH] IN BLOOD BY AUTOMATED COUNT: 23 % (ref 11–15)
GLUCOSE BLD-MCNC: 286 MG/DL (ref 70–99)
GLUCOSE BLD-MCNC: 304 MG/DL (ref 70–99)
GLUCOSE BLD-MCNC: 367 MG/DL (ref 70–99)
GLUCOSE BLD-MCNC: 380 MG/DL (ref 70–99)
GLUCOSE BLD-MCNC: 421 MG/DL (ref 70–99)
HAV IGM SER QL: 1.7 MG/DL (ref 1.6–2.6)
HAV IGM SER QL: 1.8 MG/DL (ref 1.6–2.6)
HCT VFR BLD AUTO: 23.8 % (ref 35–48)
HGB BLD-MCNC: 7.2 G/DL (ref 12–16)
LYMPHOCYTES NFR BLD: 0.94 X10(3) UL (ref 1–4)
LYMPHOCYTES NFR BLD: 4 %
MCH RBC QN AUTO: 32.4 PG (ref 26–34)
MCHC RBC AUTO-ENTMCNC: 30.3 G/DL (ref 31–37)
MCV RBC AUTO: 107.2 FL (ref 80–100)
METAMYELOCYTES # BLD: 0.24 X10(3) UL
METAMYELOCYTES NFR BLD: 1 %
MONOCYTES # BLD: 2.35 X10(3) UL (ref 0.1–1)
MONOCYTES NFR BLD: 10 %
MYELOCYTES # BLD: 0.47 X10(3) UL
MYELOCYTES NFR BLD: 2 %
NEUTROPHILS # BLD AUTO: 18.01 X10 (3) UL (ref 1.5–7.7)
NEUTROPHILS NFR BLD: 81 %
NEUTS BAND NFR BLD: 2 %
NEUTS HYPERSEG # BLD: 19.51 X10(3) UL (ref 1.5–7.7)
NEUTS VAC BLD QL SMEAR: PRESENT
OSMOLALITY SERPL CALC.SUM OF ELEC: 304 MOSM/KG (ref 275–295)
PHOSPHATE SERPL-MCNC: 1 MG/DL (ref 2.5–4.9)
PHOSPHATE SERPL-MCNC: 1.2 MG/DL (ref 2.5–4.9)
PLATELET # BLD AUTO: 293 10(3)UL (ref 150–450)
PLATELET MORPHOLOGY: NORMAL
POTASSIUM SERPL-SCNC: 3.2 MMOL/L (ref 3.5–5.1)
POTASSIUM SERPL-SCNC: 3.9 MMOL/L (ref 3.5–5.1)
RBC # BLD AUTO: 2.22 X10(6)UL (ref 3.8–5.3)
SODIUM SERPL-SCNC: 140 MMOL/L (ref 136–145)
TOTAL CELLS COUNTED: 100
WBC # BLD AUTO: 23.5 X10(3) UL (ref 4–11)

## 2019-03-02 PROCEDURE — 99232 SBSQ HOSP IP/OBS MODERATE 35: CPT | Performed by: INTERNAL MEDICINE

## 2019-03-02 PROCEDURE — 71045 X-RAY EXAM CHEST 1 VIEW: CPT | Performed by: INTERNAL MEDICINE

## 2019-03-02 PROCEDURE — 99232 SBSQ HOSP IP/OBS MODERATE 35: CPT | Performed by: HOSPITALIST

## 2019-03-02 RX ORDER — MAGNESIUM SULFATE 1 G/100ML
1 INJECTION INTRAVENOUS ONCE
Status: COMPLETED | OUTPATIENT
Start: 2019-03-02 | End: 2019-03-02

## 2019-03-02 RX ORDER — PEPPERMINT OIL
30 OIL (ML) MISCELLANEOUS AS NEEDED
Status: DISCONTINUED | OUTPATIENT
Start: 2019-03-02 | End: 2019-03-09

## 2019-03-02 NOTE — PROGRESS NOTES
[facility]  Progress Note     Karleevelma Do Patient Status:  Inpatient    1965 MRN [id number]   [facility] 4SW-A Attending Shannon Almaguer MD   Hosp Day # 12 PCP Guy Schwab, MD     Chief Complaint: unresponsive    S: Patient open mEq Oral Daily   • artificial tears   Both Eyes TID   • omeprazole  40 mg Per G Tube Daily   • ipratropium-albuterol  3 mL Nebulization 4 times per day   • fluconazole  200 mg Per G Tube Daily   • levETIRAcetam  500 mg Per G Tube Q12H   • Levothyroxine Sod

## 2019-03-02 NOTE — PROGRESS NOTES
03/02/19 1123   Vent Information   $ RT Standby Charge (per 15 min) 1   Ventilator Initiation 02/14/19   Ventilation Day(s) 17   Interface Invasive   Vent Type PB   Vent ID 4   Vent Mode VC+   Settings   FiO2 (%) 30 %   Resp Rate (Set) 12   Vt (Set, mL)

## 2019-03-02 NOTE — PROGRESS NOTES
[facility]  Progress Note    Angela Ponce Patient Status:  Inpatient    1965 MRN [id number]   [facility] 4SW-A Attending Taylor Alvarez MD   Hosp Day # 12 PCP Jose Alberto Katz MD        Subjective:  Angela Ponce is a(n) 52year old fem no cyanosis, s/p left BKA   Neurological: not opening eyes spontaneously, not tracking    Lab Data Review:  Recent Labs   Lab  02/28/19   0455  03/01/19   0558  03/02/19   0500   RBC  2.45*  2.40*  2.22*   HGB  7.9*  7.7*  7.2*   HCT  26.0*  25.3*  23.8* catheter.     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

## 2019-03-02 NOTE — PLAN OF CARE
CARDIOVASCULAR - ADULT    • Absence of cardiac arrhythmias or at baseline Progressing        Delirium    • Minimize duration of delirium Progressing        Diabetes/Glucose Control    • Glucose maintained within prescribed range Progressing        GASTROIN

## 2019-03-02 NOTE — PROGRESS NOTES
[facility]  Nephrology Progress Note    Yanely  Patient Status:  Inpatient    1965 MRN [id number]   [facility] 4SW-A Attending Timothy Ramirez MD   Hosp Day # 12 PCP Jose Alberto Trejo MD       SUBJECTIVE:  No overnight events      P --    --        Recent Labs   Lab  02/24/19   0510  02/25/19   0358  02/26/19   0453  02/27/19   0515  02/28/19   0455  03/01/19   0558  03/02/19   0500   NA  144  145  142  142  140  138  140   K  3.5  4.0  3.6  3.5  3.4*  3.3*  3.2*   CL  108*  110*  103 10 mg Intravenous Q6H   morphINE sulfate (PF) 4 MG/ML injection 1 mg 1 mg Intravenous Q4H PRN   morphINE sulfate (PF) 4 MG/ML injection 2 mg 2 mg Intravenous Q4H PRN   hydrALAzine HCl (APRESOLINE) injection 10 mg 10 mg Intravenous Q4H PRN   heparin sodium Monday    #2. Anemia- due to ESRD. Cont ESAs for goal hgb 10-11    #3. Reap failure- remains intubated with plan for trach next week    #4. Seizures- remains poorly responsive. On keppra/depakote    #5. Nutrition- s/p G-J tube.   Tolerating feeings

## 2019-03-02 NOTE — PLAN OF CARE
Patient remains on ventilator support. VSS; Opens eyes to name and pain. Tolerating tube feedings; wound care performed; q2hr repositioning.  See flow sheet additional.

## 2019-03-03 ENCOUNTER — APPOINTMENT (OUTPATIENT)
Dept: GENERAL RADIOLOGY | Facility: HOSPITAL | Age: 54
DRG: 003 | End: 2019-03-03
Attending: INTERNAL MEDICINE
Payer: MEDICARE

## 2019-03-03 LAB
ANION GAP SERPL CALC-SCNC: 9 MMOL/L (ref 0–18)
BASOPHILS # BLD: 0.26 X10(3) UL (ref 0–0.2)
BASOPHILS NFR BLD: 1 %
BUN BLD-MCNC: 19 MG/DL (ref 7–18)
BUN/CREAT SERPL: 8.8 (ref 10–20)
CALCIUM BLD-MCNC: 8.1 MG/DL (ref 8.5–10.1)
CHLORIDE SERPL-SCNC: 102 MMOL/L (ref 98–107)
CO2 SERPL-SCNC: 27 MMOL/L (ref 21–32)
CREAT BLD-MCNC: 2.15 MG/DL (ref 0.55–1.02)
DEPRECATED RDW RBC AUTO: 91 FL (ref 35.1–46.3)
EOSINOPHIL # BLD: 0 X10(3) UL (ref 0–0.7)
EOSINOPHIL NFR BLD: 0 %
ERYTHROCYTE [DISTWIDTH] IN BLOOD BY AUTOMATED COUNT: 23.3 % (ref 11–15)
GLUCOSE BLD-MCNC: 255 MG/DL (ref 70–99)
GLUCOSE BLD-MCNC: 277 MG/DL (ref 70–99)
GLUCOSE BLD-MCNC: 368 MG/DL (ref 70–99)
GLUCOSE BLD-MCNC: 377 MG/DL (ref 70–99)
GLUCOSE BLD-MCNC: 390 MG/DL (ref 70–99)
HAV IGM SER QL: 1.9 MG/DL (ref 1.6–2.6)
HCT VFR BLD AUTO: 24.8 % (ref 35–48)
HGB BLD-MCNC: 7.4 G/DL (ref 12–16)
LYMPHOCYTES NFR BLD: 2.05 X10(3) UL (ref 1–4)
LYMPHOCYTES NFR BLD: 8 %
MCH RBC QN AUTO: 32.3 PG (ref 26–34)
MCHC RBC AUTO-ENTMCNC: 29.8 G/DL (ref 31–37)
MCV RBC AUTO: 108.3 FL (ref 80–100)
METAMYELOCYTES # BLD: 0.26 X10(3) UL
METAMYELOCYTES NFR BLD: 1 %
MONOCYTES # BLD: 2.82 X10(3) UL (ref 0.1–1)
MONOCYTES NFR BLD: 11 %
NEUTROPHILS # BLD AUTO: 19.4 X10 (3) UL (ref 1.5–7.7)
NEUTROPHILS NFR BLD: 79 %
NEUTS HYPERSEG # BLD: 20.22 X10(3) UL (ref 1.5–7.7)
NRBC BLD MANUAL-RTO: 1 %
OSMOLALITY SERPL CALC.SUM OF ELEC: 297 MOSM/KG (ref 275–295)
PHOSPHATE SERPL-MCNC: 2.4 MG/DL (ref 2.5–4.9)
PLATELET # BLD AUTO: 321 10(3)UL (ref 150–450)
PLATELET MORPHOLOGY: NORMAL
POTASSIUM SERPL-SCNC: 3.9 MMOL/L (ref 3.5–5.1)
RBC # BLD AUTO: 2.29 X10(6)UL (ref 3.8–5.3)
SODIUM SERPL-SCNC: 138 MMOL/L (ref 136–145)
TOTAL CELLS COUNTED: 100
WBC # BLD AUTO: 25.6 X10(3) UL (ref 4–11)

## 2019-03-03 PROCEDURE — 99232 SBSQ HOSP IP/OBS MODERATE 35: CPT | Performed by: INTERNAL MEDICINE

## 2019-03-03 PROCEDURE — 99232 SBSQ HOSP IP/OBS MODERATE 35: CPT | Performed by: HOSPITALIST

## 2019-03-03 PROCEDURE — 71045 X-RAY EXAM CHEST 1 VIEW: CPT | Performed by: INTERNAL MEDICINE

## 2019-03-03 RX ORDER — ALBUMIN (HUMAN) 12.5 G/50ML
100 SOLUTION INTRAVENOUS AS NEEDED
Status: DISCONTINUED | OUTPATIENT
Start: 2019-03-03 | End: 2019-03-09

## 2019-03-03 RX ORDER — HEPARIN SODIUM 1000 [USP'U]/ML
1.5 INJECTION, SOLUTION INTRAVENOUS; SUBCUTANEOUS ONCE
Status: COMPLETED | OUTPATIENT
Start: 2019-03-03 | End: 2019-03-04

## 2019-03-03 NOTE — PROGRESS NOTES
[facility]  Nephrology Progress Note    Maye Denver Patient Status:  Inpatient    1965 MRN [id number]   [facility] 4SW-A Attending Nasreen Kurtz MD   Hosp Day # 16 PCP Jose Alberto Joya MD       SUBJECTIVE:  No overnight events      P BAND  2  3  1  9  2   --    INR   --   1.44*   --    --    --    --        Recent Labs   Lab  02/25/19   0358   02/27/19   0515  02/28/19   0455  03/01/19   0558  03/02/19   0500  03/02/19   1528  03/03/19   0438   NA  145   < >  142  140  138  140   -- suspension 200 mg 200 mg Per G Tube Daily   levETIRAcetam (KEPPRA) 100 MG/ML solution 500 mg 500 mg Per G Tube Q12H   Levothyroxine Sodium (SYNTHROID, LEVOTHROID) tab 125 mcg 125 mcg Per G Tube Before breakfast   multivitamin (DIALYVITE - RENAL) tab 1 tabl Intravenous Continuous PRN   vasopressin (PITRESSIN) 20 Units in sodium chloride 0.9% 50 mL infusion for shock 0.04 Units/min Intravenous Continuous PRN         Impression/Plan:    #1. ESRD- due to DM.   Was on PD but converted to HD after having candida g

## 2019-03-03 NOTE — PROGRESS NOTES
[facility]  Progress Note     Kendra Gum Patient Status:  Inpatient    1965 MRN [id number]   [facility] 4SW-A Attending Johnnie Mar MD   Hosp Day # 16 PCP Jose Alberto Marquez MD     Chief Complaint: lethargy    S: Patient opens ey reviewed in Epic.     Medications:   • piperacillin-tazobactam  3.375 g Intravenous Q8H   • [START ON 3/4/2019] epoetin geetha-epbx  10,000 Units Intravenous Once in dialysis   • heparin sodium  1.5 mL Intravenous Once   • insulin detemir  5 Units Subcutaneou

## 2019-03-03 NOTE — PLAN OF CARE
Assumed care at change of shift. Vented. No sedation. CPAP trial at 2000 with frequent periods of apnea. Switched to full vent support. Pt opens eyes to verbal and  painful stimuli. Unable to follow commands. Pupils +3 reactive and sluggish. No tracking.  t

## 2019-03-03 NOTE — RESPIRATORY THERAPY NOTE
Received pt on vent with settings:vc+/12/325/+5/30%. BS diminished bilateral, SX ET small white thick secretions. matneb tx given per order. CPAP trails done, the 2nd trail pt tolerated well with settings 10/5/30% about 80 minutes.  The 3rd trail,with same s

## 2019-03-03 NOTE — PROGRESS NOTES
[facility]  Progress Note    Sage Bentley Patient Status:  Inpatient    1965 MRN [id number]   [facility] 4SW-A Attending Shona Lowe MD   Hosp Day # 16 PCP Jose Alberto Vick MD        Subjective:  Sage Bentley is a(n) 52year old fem spontaneously, not tracking    Lab Data Review:  Recent Labs   Lab  03/01/19   0558  03/02/19   0500  03/03/19   0438   RBC  2.40*  2.22*  2.29*   HGB  7.7*  7.2*  7.4*   HCT  25.3*  23.8*  24.8*   MCV  105.4*  107.2*  108.3*   MCH  32.1  32.4  32.3   MCHC foreseeable future secondary to yeast peritonitis and removal of her intra-abdominal catheter.     #History of CVA/G-tube     #History of PAF on subcu heparin and Plavix      #History of MI/ischemic cardiomyopathy last EF on record 9/17 of 40% with normal

## 2019-03-04 ENCOUNTER — APPOINTMENT (OUTPATIENT)
Dept: GENERAL RADIOLOGY | Facility: HOSPITAL | Age: 54
DRG: 003 | End: 2019-03-04
Attending: INTERNAL MEDICINE
Payer: MEDICARE

## 2019-03-04 ENCOUNTER — APPOINTMENT (OUTPATIENT)
Dept: CT IMAGING | Facility: HOSPITAL | Age: 54
DRG: 003 | End: 2019-03-04
Attending: INTERNAL MEDICINE
Payer: MEDICARE

## 2019-03-04 LAB
ALLENS TEST: POSITIVE
ANION GAP SERPL CALC-SCNC: 10 MMOL/L (ref 0–18)
ARTERIAL BLD GAS O2 SATURATION: 95 % (ref 92–100)
ARTERIAL BLOOD GAS BASE EXCESS: -1.1 MMOL/L (ref ?–2)
ARTERIAL BLOOD GAS HCO3: 24.9 MEQ/L (ref 22–26)
ARTERIAL BLOOD GAS PCO2: 49 MM HG (ref 35–45)
ARTERIAL BLOOD GAS PH: 7.33 (ref 7.35–7.45)
ARTERIAL BLOOD GAS PO2: 92 MM HG (ref 80–105)
BASOPHILS # BLD: 0 X10(3) UL (ref 0–0.2)
BASOPHILS NFR BLD: 0 %
BUN BLD-MCNC: 31 MG/DL (ref 7–18)
BUN/CREAT SERPL: 11.7 (ref 10–20)
C DIFF TOX B STL QL: NEGATIVE
CALCIUM BLD-MCNC: 8 MG/DL (ref 8.5–10.1)
CALCULATED O2 SATURATION: 96 % (ref 92–100)
CARBOXYHEMOGLOBIN: 1.8 % SAT (ref 0–3)
CHLORIDE SERPL-SCNC: 102 MMOL/L (ref 98–107)
CO2 SERPL-SCNC: 25 MMOL/L (ref 21–32)
CREAT BLD-MCNC: 2.64 MG/DL (ref 0.55–1.02)
DEPRECATED RDW RBC AUTO: 94.6 FL (ref 35.1–46.3)
EOSINOPHIL # BLD: 0 X10(3) UL (ref 0–0.7)
EOSINOPHIL NFR BLD: 0 %
ERYTHROCYTE [DISTWIDTH] IN BLOOD BY AUTOMATED COUNT: 24 % (ref 11–15)
FIO2: 30 %
GLUCOSE BLD-MCNC: 168 MG/DL (ref 70–99)
GLUCOSE BLD-MCNC: 277 MG/DL (ref 70–99)
GLUCOSE BLD-MCNC: 279 MG/DL (ref 70–99)
GLUCOSE BLD-MCNC: 284 MG/DL (ref 70–99)
GLUCOSE BLD-MCNC: 382 MG/DL (ref 70–99)
GLUCOSE BLD-MCNC: 82 MG/DL (ref 70–99)
HAV IGM SER QL: 2.1 MG/DL (ref 1.6–2.6)
HCT VFR BLD AUTO: 23.4 % (ref 35–48)
HGB BLD-MCNC: 7 G/DL (ref 12–16)
IONIZED CALCIUM: 1.2 MMOL/L (ref 1.12–1.32)
LACTIC ACID ARTERIAL: 1.6 MMOL/L (ref 0.5–2)
LYMPHOCYTES NFR BLD: 1.1 X10(3) UL (ref 1–4)
LYMPHOCYTES NFR BLD: 4 %
MCH RBC QN AUTO: 32.7 PG (ref 26–34)
MCHC RBC AUTO-ENTMCNC: 29.9 G/DL (ref 31–37)
MCV RBC AUTO: 109.3 FL (ref 80–100)
METAMYELOCYTES # BLD: 0.83 X10(3) UL
METAMYELOCYTES NFR BLD: 3 %
METHEMOGLOBIN: 0.6 % SAT (ref 0.4–1.5)
MONOCYTES # BLD: 3.58 X10(3) UL (ref 0.1–1)
MONOCYTES NFR BLD: 13 %
NEUTROPHILS # BLD AUTO: 21.86 X10 (3) UL (ref 1.5–7.7)
NEUTROPHILS NFR BLD: 80 %
NEUTS HYPERSEG # BLD: 22 X10(3) UL (ref 1.5–7.7)
OSMOLALITY SERPL CALC.SUM OF ELEC: 300 MOSM/KG (ref 275–295)
P/F RATIO: 305.7 MMHG
PATIENT TEMPERATURE: 98.8 F
PEEP: 5 CM H2O
PHOSPHATE SERPL-MCNC: 2.5 MG/DL (ref 2.5–4.9)
PLATELET # BLD AUTO: 325 10(3)UL (ref 150–450)
PLATELET MORPHOLOGY: NORMAL
POTASSIUM BLOOD GAS: 5.1 MMOL/L (ref 3.6–5.1)
POTASSIUM SERPL-SCNC: 5.3 MMOL/L (ref 3.5–5.1)
RBC # BLD AUTO: 2.14 X10(6)UL (ref 3.8–5.3)
SODIUM BLOOD GAS: 134 MMOL/L (ref 136–144)
SODIUM SERPL-SCNC: 137 MMOL/L (ref 136–145)
TIDAL VOLUME: 325 ML
TOTAL CELLS COUNTED: 100
TOTAL HEMOGLOBIN: 7.5 G/DL (ref 11.7–16)
VENT RATE: 12 /MIN
WBC # BLD AUTO: 27.5 X10(3) UL (ref 4–11)

## 2019-03-04 PROCEDURE — 71045 X-RAY EXAM CHEST 1 VIEW: CPT | Performed by: INTERNAL MEDICINE

## 2019-03-04 PROCEDURE — 99232 SBSQ HOSP IP/OBS MODERATE 35: CPT | Performed by: HOSPITALIST

## 2019-03-04 PROCEDURE — 99233 SBSQ HOSP IP/OBS HIGH 50: CPT | Performed by: INTERNAL MEDICINE

## 2019-03-04 PROCEDURE — 74177 CT ABD & PELVIS W/CONTRAST: CPT | Performed by: INTERNAL MEDICINE

## 2019-03-04 PROCEDURE — 71260 CT THORAX DX C+: CPT | Performed by: INTERNAL MEDICINE

## 2019-03-04 RX ORDER — FLUCONAZOLE 100 MG/1
200 TABLET ORAL
Status: DISCONTINUED | OUTPATIENT
Start: 2019-03-04 | End: 2019-03-05

## 2019-03-04 NOTE — DIETARY NOTE
NUTRITION Follow up  ASSESSMENT    Pt is at high nutrition risk. Pt does not meet malnutrition criteria.     NUTRITION DIAGNOSIS/PROBLEM:    Inadequate oral intake related to inability to consume sufficient energy as evidenced by the need for g tube feeding semi-elemental easier to digest.   Of note pt is non responsive and unable to state any abdominal discomfort, pain or nausea. Pt with multiple non healing wounds to back per RN.   Pt fed via g-tube   2/15 -Consult d/t pt with home g tube feedings    EVALUA

## 2019-03-04 NOTE — PROGRESS NOTES
[address] [place] [address]  TEL: [phone]  FAX: [phone]    Clemencia Da Silva Patient Status:  Inpatient    1965 MRN [id number]   [facility] 4SW-A Attending Irma Cruz MD   Hosp Day # 25 PCP Nas Gibson MD 8  4   MON  7  7   < >  10  11  13   EOS  0  1   < >  0  0  0   NRBC  1*  2*   --    --   1*   --     < > = values in this interval not displayed.      Recent Labs   Lab  03/02/19   0500  03/02/19   1528  03/03/19   0438  03/04/19   0440   GLU  367*   -- Trimethoprim/Sulfa >80 Resistant      Colistin <=2 Sensitive      Tigecycline* <=2        * This antibiotic result is an MICHAEL. No interpretation is currently available. Ceftolozane/tazobactam* >8        * This antibiotic result is an MICHAEL.  No interpretat seems to be MS    5) large sacral decub ulcer- w/o signs of infection. - cont to monitor    Poor prognosis d/w pt's family at the bedside.  D/w NAVJOT Esparza

## 2019-03-04 NOTE — RESPIRATORY THERAPY NOTE
Received pt on vent with settings :VC+/12/325/+5/30%, BS diminish bilateral, SX ET small light yellow thick secretions. cpap trial 10/5/30% done this evening, tolerated well about one hour. Will continue monitor pt closely.

## 2019-03-04 NOTE — PROGRESS NOTES
[facility] Pharmacy Note:  Renal Adjustment for fluconazole (DIFLUCAN)     Alden Lovett is a 52year old female who has been prescribed fluconazole (DIFLUCAN)  200 mg every 24 hrs.   Patient receives hemodialysis three times a week (MWF) so the dose has been adjuste

## 2019-03-04 NOTE — PROGRESS NOTES
[facility]  Nephrology Progress Note    Matilda Sparks Attending:  Woody Dash MD       Assessment and Plan:    1) ESRD- due to diabetes; PD cath removed due to Candida Glabrata in peritoneal fluid cx.  Will not resume PD at any point given FT and risk fo CREATSERUM 2.64 03/04/2019    BUN 31 03/04/2019     03/04/2019    K 5.3 03/04/2019     03/04/2019    CO2 25.0 03/04/2019     03/04/2019    CA 8.0 03/04/2019    MG 2.1 03/04/2019    PHOS 2.5 03/04/2019    PGLU 279 03/04/2019       Imaging Insulin Aspart Pen (NOVOLOG) 100 UNIT/ML flexpen 1-15 Units 1-15 Units Subcutaneous 4 times per day   Valproate Sodium (DEPAKENE) soln 750 mg 750 mg Per NG Tube 2 times per day   Dakins (1/2 strength) (Sodium hypochlorite (1/2 strength)) 0.2-0.25 % exter

## 2019-03-04 NOTE — PLAN OF CARE
Received pt change of shift. Vented no sedation. Minimal response. Unable to follow commands. Opens eyes to verbal, painful stimuli. No tracking. Afebrile. Lungs sound course throughout with thick white secretions. CPAP trial 1 hr this evening.  Tolerated we

## 2019-03-04 NOTE — PROGRESS NOTES
Subj: Patient seen in ICU. Intubated/sedated. Non-communicative. Obj: Exam reveals left abdominal incision to be clean/dry/intact, no erythema, good healing, no drainage or dehiscence, staples intact.     Assessment: POD 14 removal of infected peritoneal

## 2019-03-04 NOTE — PROGRESS NOTES
[facility]  Progress Note     Darin Rizo Patient Status:  Inpatient    1965 MRN [id number]   [facility] 4SW-A Attending Génesis Orozco MD   Hosp Day # 25 PCP Grace Hernandez MD     Chief Complaint: lethargy    S: Patient opens ey fluconazole  200 mg Oral Once per day on Mon Wed Fri   • piperacillin-tazobactam  3.375 g Intravenous Q8H   • epoetin geetha-epbx  10,000 Units Intravenous Once in dialysis   • heparin sodium  1.5 mL Intravenous Once   • insulin detemir  5 Units Subcutaneous 10. Hypothyroidism  11. H/o CVA with left sided weakness   12. Difficulty tolerating TF   1. s/p GJ tube extension done on 2/27  13.  Sacral/buttock, LLE stump, R ankle pressure sores POA     Quality:  · DVT ppx: heparin  · Code status: full, appropriate

## 2019-03-04 NOTE — CM/SW NOTE
PEARL spoke with Janett FRITZ who states that family is leaning towards [facility] but he would like to confirm with other family members. Janett Long states that he will call back later in the day, once he has spoke to family.     Liyah Campbell, MSN RN, CTL/

## 2019-03-04 NOTE — PROGRESS NOTES
[facility]  Progress Note    Nohemi Reilly Patient Status:  Inpatient    1965 MRN [id number]   [facility] 4SW-A Attending Albina Rhodes MD   Hosp Day # 25 PCP Jose Alberto Downs MD     Subjective:  Nohemi Reilly is a(n) 52year old female. RBC  2.22*  2.29*  2.14*   HGB  7.2*  7.4*  7.0*   HCT  23.8*  24.8*  23.4*   MCV  107.2*  108.3*  109.3*   MCH  32.4  32.3  32.7   MCHC  30.3*  29.8*  29.9*   RDW  23.0*  23.3*  24.0*   NEPRELIM  18.01*  19.40*  21.86*   WBC  23.5*  25.6*  27.5*   PLT end-stage renal disease with renal following.  On hemodialysis for the foreseeable future secondary to yeast peritonitis and removal of her intra-abdominal catheter.     #History of CVA/G-tube     #History of PAF on subcu heparin and Plavix      #History o

## 2019-03-05 ENCOUNTER — APPOINTMENT (OUTPATIENT)
Dept: GENERAL RADIOLOGY | Facility: HOSPITAL | Age: 54
DRG: 003 | End: 2019-03-05
Attending: INTERNAL MEDICINE
Payer: MEDICARE

## 2019-03-05 LAB
ALBUMIN SERPL-MCNC: 1.6 G/DL (ref 3.4–5)
ALBUMIN/GLOB SERPL: 0.3 {RATIO} (ref 1–2)
ALP LIVER SERPL-CCNC: 500 U/L (ref 41–108)
ALT SERPL-CCNC: 19 U/L (ref 13–56)
ANION GAP SERPL CALC-SCNC: 7 MMOL/L (ref 0–18)
ANTIBODY SCREEN: NEGATIVE
AST SERPL-CCNC: 29 U/L (ref 15–37)
BASOPHILS # BLD: 0 X10(3) UL (ref 0–0.2)
BASOPHILS NFR BLD: 0 %
BILIRUB SERPL-MCNC: 0.4 MG/DL (ref 0.1–2)
BUN BLD-MCNC: 12 MG/DL (ref 7–18)
BUN/CREAT SERPL: 10.1 (ref 10–20)
CALCIUM BLD-MCNC: 8.4 MG/DL (ref 8.5–10.1)
CHLORIDE SERPL-SCNC: 99 MMOL/L (ref 98–107)
CO2 SERPL-SCNC: 29 MMOL/L (ref 21–32)
CREAT BLD-MCNC: 1.19 MG/DL (ref 0.55–1.02)
CRYPTOSP AG STL QL IA: NEGATIVE
DEPRECATED RDW RBC AUTO: 93.4 FL (ref 35.1–46.3)
EOSINOPHIL # BLD: 0 X10(3) UL (ref 0–0.7)
EOSINOPHIL NFR BLD: 0 %
ERYTHROCYTE [DISTWIDTH] IN BLOOD BY AUTOMATED COUNT: 24.5 % (ref 11–15)
G LAMBLIA AG STL QL IA: NEGATIVE
GLOBULIN PLAS-MCNC: 4.7 G/DL (ref 2.8–4.4)
GLUCOSE BLD-MCNC: 100 MG/DL (ref 70–99)
GLUCOSE BLD-MCNC: 122 MG/DL (ref 70–99)
GLUCOSE BLD-MCNC: 129 MG/DL (ref 70–99)
GLUCOSE BLD-MCNC: 201 MG/DL (ref 70–99)
GLUCOSE BLD-MCNC: 241 MG/DL (ref 70–99)
GLUCOSE BLD-MCNC: 26 MG/DL (ref 70–99)
GLUCOSE BLD-MCNC: 30 MG/DL (ref 70–99)
GLUCOSE BLD-MCNC: 362 MG/DL (ref 70–99)
HAV IGM SER QL: 1.7 MG/DL (ref 1.6–2.6)
HCT VFR BLD AUTO: 21.4 % (ref 35–48)
HGB BLD-MCNC: 6.8 G/DL (ref 12–16)
HGB BLD-MCNC: 8.9 G/DL (ref 12–16)
INR BLD: 1.33 (ref 0.9–1.1)
LYMPHOCYTES NFR BLD: 12 %
LYMPHOCYTES NFR BLD: 3.71 X10(3) UL (ref 1–4)
M PROTEIN MFR SERPL ELPH: 6.3 G/DL (ref 6.4–8.2)
MCH RBC QN AUTO: 34 PG (ref 26–34)
MCHC RBC AUTO-ENTMCNC: 31.8 G/DL (ref 31–37)
MCV RBC AUTO: 107 FL (ref 80–100)
METAMYELOCYTES # BLD: 0.31 X10(3) UL
METAMYELOCYTES NFR BLD: 1 %
MONOCYTES # BLD: 4.33 X10(3) UL (ref 0.1–1)
MONOCYTES NFR BLD: 14 %
NEUTROPHILS # BLD AUTO: 23.58 X10 (3) UL (ref 1.5–7.7)
NEUTROPHILS NFR BLD: 73 %
NEUTS HYPERSEG # BLD: 22.56 X10(3) UL (ref 1.5–7.7)
OSMOLALITY SERPL CALC.SUM OF ELEC: 276 MOSM/KG (ref 275–295)
PHOSPHATE SERPL-MCNC: 1.3 MG/DL (ref 2.5–4.9)
PLATELET # BLD AUTO: 257 10(3)UL (ref 150–450)
PLATELET MORPHOLOGY: NORMAL
POTASSIUM SERPL-SCNC: 3.3 MMOL/L (ref 3.5–5.1)
PROCALCITONIN SERPL-MCNC: >150 NG/ML
PSA SERPL DL<=0.01 NG/ML-MCNC: 17.2 SECONDS (ref 12.5–14.7)
RBC # BLD AUTO: 2 X10(6)UL (ref 3.8–5.3)
RH BLOOD TYPE: POSITIVE
SODIUM SERPL-SCNC: 135 MMOL/L (ref 136–145)
TOTAL CELLS COUNTED: 100
WBC # BLD AUTO: 30.9 X10(3) UL (ref 4–11)

## 2019-03-05 PROCEDURE — 99232 SBSQ HOSP IP/OBS MODERATE 35: CPT | Performed by: HOSPITALIST

## 2019-03-05 PROCEDURE — 99233 SBSQ HOSP IP/OBS HIGH 50: CPT | Performed by: INTERNAL MEDICINE

## 2019-03-05 PROCEDURE — 71045 X-RAY EXAM CHEST 1 VIEW: CPT | Performed by: INTERNAL MEDICINE

## 2019-03-05 PROCEDURE — 0JD80ZZ EXTRACTION OF ABDOMEN SUBCUTANEOUS TISSUE AND FASCIA, OPEN APPROACH: ICD-10-PCS | Performed by: COLON & RECTAL SURGERY

## 2019-03-05 RX ORDER — SODIUM CHLORIDE 9 MG/ML
INJECTION, SOLUTION INTRAVENOUS ONCE
Status: COMPLETED | OUTPATIENT
Start: 2019-03-05 | End: 2019-03-05

## 2019-03-05 RX ORDER — FLUCONAZOLE 100 MG/1
200 TABLET ORAL DAILY
Status: DISCONTINUED | OUTPATIENT
Start: 2019-03-05 | End: 2019-03-06

## 2019-03-05 RX ORDER — LOPERAMIDE HYDROCHLORIDE 2 MG/1
2 CAPSULE ORAL 4 TIMES DAILY PRN
Status: DISCONTINUED | OUTPATIENT
Start: 2019-03-05 | End: 2019-03-05 | Stop reason: SDUPTHER

## 2019-03-05 RX ORDER — LOPERAMIDE HCL 1 MG/7.5ML
2 SOLUTION ORAL 4 TIMES DAILY PRN
Status: DISCONTINUED | OUTPATIENT
Start: 2019-03-05 | End: 2019-03-09

## 2019-03-05 RX ORDER — DEXTROSE MONOHYDRATE 100 MG/ML
INJECTION, SOLUTION INTRAVENOUS CONTINUOUS
Status: DISCONTINUED | OUTPATIENT
Start: 2019-03-05 | End: 2019-03-05

## 2019-03-05 RX ORDER — ARIPIPRAZOLE 15 MG/1
40 TABLET ORAL ONCE
Status: COMPLETED | OUTPATIENT
Start: 2019-03-05 | End: 2019-03-05

## 2019-03-05 RX ORDER — CISATRACURIUM BESYLATE 2 MG/ML
10 INJECTION INTRAVENOUS
Status: DISPENSED | OUTPATIENT
Start: 2019-03-06 | End: 2019-03-06

## 2019-03-05 RX ORDER — METOCLOPRAMIDE HYDROCHLORIDE 5 MG/ML
10 INJECTION INTRAMUSCULAR; INTRAVENOUS EVERY 6 HOURS PRN
Status: DISCONTINUED | OUTPATIENT
Start: 2019-03-05 | End: 2019-03-09

## 2019-03-05 NOTE — PROGRESS NOTES
Gastroenterology Progress Note  Antonietta Barker Patient Status:  Inpatient    1965 MRN [id number]   [facility] 4SW-A Attending Elyssa Bolanos MD   Hosp Day # 23 PCP Baldemar Kessler MD     Chief 03/03/19   0438  03/04/19   0440  03/05/19   0414   NA  138  140   --   138  137  135*   K  3.3*  3.2*  3.9  3.9  5.3*  3.3*   CL  102  103   --   102  102  99   CO2  27.0  29.0   --   27.0  25.0  29.0   BUN  11  10   --   19*  31*  12   CREATSERUM  2.22* mild diminutive caliber of the right kidney. There is   some nonspecific fluid surrounding several proximal small bowel loops within the upper abdomen. Questionable bowel wall thickening present . A nonspecific enteritis may be possible.      Visualized contributing to symptoms as wells as the oral contrast. C Diff negative. Will expand stool studies and continue supportive care measures   Plan:   1. Change Reglan from scheduled dosing to PRN   2.  Stool for culture and O & P  3. Resume TF per dietary merrill

## 2019-03-05 NOTE — PROGRESS NOTES
[address] [place] [address]  TEL: [phone]  FAX: [phone]    Aj Moises Patient Status:  Inpatient    1965 MRN [id number]   [facility] 4SW-A Attending Tristan West MD   [facility] Day # 23 PCP Lex Pitts MD Lab  03/03/19   0438  03/04/19   0440  03/05/19   0414   GLU  255*  277*  30*   BUN  19*  31*  12   CREATSERUM  2.15*  2.64*  1.19*   GFRAA  29*  23*  59*   GFRNAA  25*  20*  52*   CA  8.1*  8.0*  8.4*   ALB   --    --   1.6*   NA  138  137  135*   K  3. result is an MICHAEL. No interpretation is currently available. Ceftolozane/tazobactam* >8        * This antibiotic result is an MICHAEL. No interpretation is currently available. Ceftazidime/avibactam* >16        * This antibiotic result is an MICHAEL.  No int to be MS. Does not seem to have VAP clinically, suspect that + cx from ETT represents colonization rather than true infection    5) large sacral decub ulcer- w/o signs of infection. - cont to monitor    D/w RN.  No family at the bedside today    [address] [facility]

## 2019-03-05 NOTE — PROGRESS NOTES
[facility]  Nephrology Progress Note    Nancy Factor Attending:  Janie Oakley MD       Assessment and Plan:    1) ESRD- due to diabetes; PD cath removed due to Candida Glabrata in peritoneal fluid cx.  Will not resume PD at any point given FT and risk fo moving all extremities  Skin: Warm and dry, no rashes       Labs:   Lab Results   Component Value Date    WBC 30.9 03/05/2019    HGB 6.8 03/05/2019    HCT 21.4 03/05/2019    .0 03/05/2019    CREATSERUM 1.19 03/05/2019    BUN 12 03/05/2019     Intravenous Q6H   morphINE sulfate (PF) 4 MG/ML injection 1 mg 1 mg Intravenous Q4H PRN   morphINE sulfate (PF) 4 MG/ML injection 2 mg 2 mg Intravenous Q4H PRN   hydrALAzine HCl (APRESOLINE) injection 10 mg 10 mg Intravenous Q4H PRN   heparin sodium 1000 U

## 2019-03-05 NOTE — CM/SW NOTE
Plan is for trach tomorrow. ECIN updates to [facility]. Called to POA son Herve Napoles to get decision on which LTAC he wants? No answer. No voice mail capacity. Plan: follow with POA for LTAC choice.    Sanjay Gaming, 03/05/19, 3:00

## 2019-03-05 NOTE — PROGRESS NOTES
[facility] Pharmacy Note:  Renal Adjustment for piperacillin/tazobactam (Teresa Fernandes)    Danny Whelan is a 52year old female who has been prescribed piperacillin/tazobactam (ZOSYN) 3.375 g every 8 hrs.   Patient is on hemodialysis so the dose has been adjusted to piperac

## 2019-03-05 NOTE — PLAN OF CARE
GASTROINTESTINAL - ADULT    • Maintains adequate nutritional intake (undernourished) Not Progressing        NEUROLOGICAL - ADULT    • Achieves stable or improved neurological status Not Progressing    • Achieves maximal functionality and self care Not Prog pressure ulcers cleaned and applied dressing as ordered. Kept HOB above 30 degrees to prevent aspiration. Kept on Clinitron bed, repositioned every 2 hours to prevent further skin breakdown.

## 2019-03-05 NOTE — PROGRESS NOTES
[facility]  Progress Note     Gonzales Zheng Patient Status:  Inpatient    1965 MRN [id number]   [facility] 4SW-A Attending Prerna Anna MD   Hosp Day # 23 PCP Elsa Canseco MD     Chief Complaint: unable to obtain due to intubat 6.3*       Estimated Creatinine Clearance: 44.2 mL/min (A) (based on SCr of 1.19 mg/dL (H)). Recent Labs   Lab  02/27/19   0515   PTP  18.3*   INR  1.44*       No results for input(s): TROP, CK in the last 168 hours.          Imaging: Imaging data review SSI lifted further as precaution  6. CAD/CMP prior MI s/p stenting  7. PAF  8. PAD H/o Left BKA on plavix- on hold now in view of trach pending  9. ESRD on HD  1. Renal on Cs, PD catheter removed. 10. Hypothyroidism  11.  H/o CVA with left sided weakness

## 2019-03-05 NOTE — PROGRESS NOTES
Notified this AM of drop in Hgb from 7.0 to 6.8--no active bleeding noted. BP has been stable throughout the night but RN reporting, just now, a drop to systolic in the 28R and Levophed being started.     AM labs reviewed--hypoglycemia noted on serum gluco

## 2019-03-05 NOTE — PLAN OF CARE
Placed call to [facility] in order to obtain consent for Tracheostomy. Voice mail has not been set up; unable to leave message. Will continue to attempt to reach him.

## 2019-03-05 NOTE — PROGRESS NOTES
[facility]  Progress Note    Eusebia Munroe Patient Status:  Inpatient    1965 MRN [id number]   [facility] 4SW-A Attending Jonh Bello MD   Hosp Day # 23 PCP Jose Alberto Toledo MD     Subjective:  Eusebia Munroe is a(n) 52year old female. 29.8*  29.9*  31.8   RDW  23.3*  24.0*  24.5*   NEPRELIM  19.40*  21.86*  23.58*   WBC  25.6*  27.5*  30.9*   PLT  321.0  325.0  257.0     Recent Labs   Lab  03/03/19   0438  03/04/19   0440  03/05/19   0414   GLU  255*  277*  30*   BUN  19*  31*  12   CRE stage IV sacral and stump wounds-off systemic antibiotics for this at present, although as noted above she is now on Zosyn.     #History of end-stage renal disease with renal following.  On hemodialysis for the foreseeable future secondary to yeast periton

## 2019-03-05 NOTE — CM/SW NOTE
PEARL received call back from son Cherelle Leija. Cherelle Leija advises he is \"almost sure\" they have decided on [facility] for LTAC. Made Prakash aware he has to either speak with or meet liaison Danisha to confirm planning. Cherelle Leija asks for Salbador Gosselin to call him.   Called to [facility]

## 2019-03-06 ENCOUNTER — APPOINTMENT (OUTPATIENT)
Dept: GENERAL RADIOLOGY | Facility: HOSPITAL | Age: 54
DRG: 003 | End: 2019-03-06
Attending: INTERNAL MEDICINE
Payer: MEDICARE

## 2019-03-06 LAB
ANION GAP SERPL CALC-SCNC: 13 MMOL/L (ref 0–18)
BASOPHILS # BLD AUTO: 0.06 X10(3) UL (ref 0–0.2)
BASOPHILS NFR BLD AUTO: 0.3 %
BLOOD TYPE BARCODE: 7300
BUN BLD-MCNC: 20 MG/DL (ref 7–18)
BUN/CREAT SERPL: 10.8 (ref 10–20)
CALCIUM BLD-MCNC: 7.1 MG/DL (ref 8.5–10.1)
CHLORIDE SERPL-SCNC: 96 MMOL/L (ref 98–107)
CO2 SERPL-SCNC: 22 MMOL/L (ref 21–32)
CREAT BLD-MCNC: 1.85 MG/DL (ref 0.55–1.02)
DEPRECATED RDW RBC AUTO: 80 FL (ref 35.1–46.3)
EOSINOPHIL # BLD AUTO: 0.02 X10(3) UL (ref 0–0.7)
EOSINOPHIL NFR BLD AUTO: 0.1 %
ERYTHROCYTE [DISTWIDTH] IN BLOOD BY AUTOMATED COUNT: 22.8 % (ref 11–15)
GLUCOSE BLD-MCNC: 219 MG/DL (ref 70–99)
GLUCOSE BLD-MCNC: 244 MG/DL (ref 70–99)
GLUCOSE BLD-MCNC: 262 MG/DL (ref 70–99)
GLUCOSE BLD-MCNC: 349 MG/DL (ref 70–99)
GLUCOSE BLD-MCNC: 368 MG/DL (ref 70–99)
GLUCOSE BLD-MCNC: 408 MG/DL (ref 70–99)
HCT VFR BLD AUTO: 26.2 % (ref 35–48)
HGB BLD-MCNC: 8.6 G/DL (ref 12–16)
IMM GRANULOCYTES # BLD AUTO: 0.44 X10(3) UL (ref 0–1)
IMM GRANULOCYTES NFR BLD: 2 %
INR BLD: 1.23 (ref 0.9–1.1)
LYMPHOCYTES # BLD AUTO: 1.48 X10(3) UL (ref 1–4)
LYMPHOCYTES NFR BLD AUTO: 6.9 %
MCH RBC QN AUTO: 33 PG (ref 26–34)
MCHC RBC AUTO-ENTMCNC: 32.8 G/DL (ref 31–37)
MCV RBC AUTO: 100.4 FL (ref 80–100)
MONOCYTES # BLD AUTO: 2.5 X10(3) UL (ref 0.1–1)
MONOCYTES NFR BLD AUTO: 11.6 %
NEUTROPHILS # BLD AUTO: 16.98 X10 (3) UL (ref 1.5–7.7)
NEUTROPHILS # BLD AUTO: 16.98 X10(3) UL (ref 1.5–7.7)
NEUTROPHILS NFR BLD AUTO: 79.1 %
OSMOLALITY SERPL CALC.SUM OF ELEC: 289 MOSM/KG (ref 275–295)
PLATELET # BLD AUTO: 282 10(3)UL (ref 150–450)
POTASSIUM SERPL-SCNC: 4.9 MMOL/L (ref 3.5–5.1)
PSA SERPL DL<=0.01 NG/ML-MCNC: 16.1 SECONDS (ref 12.5–14.7)
RBC # BLD AUTO: 2.61 X10(6)UL (ref 3.8–5.3)
SODIUM SERPL-SCNC: 131 MMOL/L (ref 136–145)
WBC # BLD AUTO: 21.5 X10(3) UL (ref 4–11)

## 2019-03-06 PROCEDURE — 99232 SBSQ HOSP IP/OBS MODERATE 35: CPT | Performed by: HOSPITALIST

## 2019-03-06 PROCEDURE — 71045 X-RAY EXAM CHEST 1 VIEW: CPT | Performed by: INTERNAL MEDICINE

## 2019-03-06 PROCEDURE — 99291 CRITICAL CARE FIRST HOUR: CPT | Performed by: INTERNAL MEDICINE

## 2019-03-06 RX ORDER — HEPARIN SODIUM 5000 [USP'U]/ML
5000 INJECTION, SOLUTION INTRAVENOUS; SUBCUTANEOUS EVERY 12 HOURS SCHEDULED
Status: COMPLETED | OUTPATIENT
Start: 2019-03-06 | End: 2019-03-07

## 2019-03-06 NOTE — PROGRESS NOTES
03/06/19 1130   Vent Information   $ RT Standby Charge (per 15 min) 1   Ventilator Initiation 02/14/19   Ventilation Day(s) 21   Interface Invasive   Vent Type PB   Vent ID 4   Vent Mode VC+   Settings   FiO2 (%) 30 %   Resp Rate (Set) 12   Vt (Set, mL)

## 2019-03-06 NOTE — CM/SW NOTE
Ethics Committee meeting arranged per [facility] for tomorrow at approx 1100. Freddy Buenrostro advises time could likely be adjusted tomorrow to accommodate family if they are willing to attend.  CM called to POA son Vangie Xie to update him and invite him and his siblings

## 2019-03-06 NOTE — PROGRESS NOTES
[facility]  Nephrology Progress Note    Jaleesa Babb Attending:  Ashley Posada MD       Assessment and Plan:    1) ESRD- due to diabetes; PD cath removed due to Candida Glabrata in peritoneal fluid cx.  Will not resume PD at any point given FT and risk fo icterus, MMM  Neck: Supple, no CHRISSY or thyromegaly  Cardiac: Regular rate and rhythm, S1, S2 normal, no murmur or tub  Lungs: Decreased BS at bases bilaterally   Abdomen: Soft, non-tender. + bowel sounds, no palpable organomegaly  Extremities: Without clubb levETIRAcetam (KEPPRA) 100 MG/ML solution 500 mg 500 mg Per G Tube Q12H   Levothyroxine Sodium (SYNTHROID, LEVOTHROID) tab 125 mcg 125 mcg Per G Tube Before breakfast   multivitamin (DIALYVITE - RENAL) tab 1 tablet 1 tablet Per NG Tube Daily   morphINE s family by bedside.           Suahil Stewart  3/6/2019  1133 AM

## 2019-03-06 NOTE — PROGRESS NOTES
[facility]  Progress Note     Lee Goldsmith Patient Status:  Inpatient    1965 MRN [id number]   [facility] 4SW-A Attending Sue Mujica MD   Hosp Day # 21 PCP Ny Burnette MD     Chief Complaint: unable to obtain due to intubat CO2  25.0  29.0  22.0   ALKPHO   --   500*   --    AST   --   29   --    ALT   --   19   --    BILT   --   0.4   --    TP   --   6.3*   --        Estimated Creatinine Clearance: 28.4 mL/min (A) (based on SCr of 1.85 mg/dL (H)).     Recent Labs   Lab  03/0 levemir, dextrose IVF, SSI lifted further as precaution  6. CAD/CMP prior MI s/p stenting  7. PAF  8. PAD H/o Left BKA on plavix- on hold now in view of trach pending  9. ESRD on HD  1. Renal on Cs, PD catheter removed. 10. Hypothyroidism  11.  H/o CVA wi

## 2019-03-06 NOTE — PROGRESS NOTES
[facility]  Progress Note    Kenia Lee Patient Status:  Inpatient    1965 MRN [id number]   [facility] 4SW-A Attending Alka Branch MD   Hosp Day # 21 PCP Jose Alberto King MD     Subjective:  Kenia Lee is a(n) 52year old female. contracted.     Lab Data Review:  Recent Labs   Lab  03/04/19   0440  03/05/19   0414  03/05/19   1128  03/06/19   0456   RBC  2.14*  2.00*   --   2.61*   HGB  7.0*  6.8*  8.9*  8.6*   HCT  23.4*  21.4*   --   26.2*   MCV  109.3*  107.0*   --   100.4[facility] antibiotics for this at present, although as noted above she is now on Zosyn.     #History of end-stage renal disease with renal following.  On hemodialysis for the foreseeable future secondary to yeast peritonitis and removal of her intra-abdominal cathet

## 2019-03-06 NOTE — CM/SW NOTE
[facility] liaison [place] Leonidas here to evaluate pt for LTAC. Plan was for trach today. Just got call from ICU charge nurse that Dr. Gage Everett now has requested hold on trach and wants ethics consult done before proceeding.   CM attempting to locate Ethics Committ

## 2019-03-06 NOTE — RESPIRATORY THERAPY NOTE
Received pt on vent with settings:VC+/12/325/+5/30%, BS diminished bilateral, SX ET large to small yellow thick secretions. cpap trial done this evening, 10/5/30%, pt tolerated well about 80 minutes. Will continue monitor pt closely.

## 2019-03-06 NOTE — PLAN OF CARE
GASTROINTESTINAL - ADULT    • Maintains adequate nutritional intake (undernourished) Not Progressing        RESPIRATORY - ADULT    • Achieves optimal ventilation and oxygenation Not Progressing        SKIN/TISSUE INTEGRITY - ADULT    • Skin integrity remai

## 2019-03-06 NOTE — PLAN OF CARE
Diabetes/Glucose Control    • Glucose maintained within prescribed range Not Progressing        Tube feeding held at 0500H as preparation for tracheostomy procedure today. Repeat accucheck this AM is 368 mg/dl, informed Dr. Mariia Christopher with orders.  Gave 10 uni

## 2019-03-06 NOTE — PLAN OF CARE
Spoke with Stephanie in wound care, updated her on the new wound of the abdomen from the drained abcess. She stated to continue to use moist gauze and covered dressing.  She will be by to evaluate tomorrow when she sees patient for scheduled wound care on her b

## 2019-03-06 NOTE — PROGRESS NOTES
I was called by the nurse and notified of a dark brown murky thick fluid draining from the patient's left mid abdomen incision. Nurse states that yesterday she had the patient at this was not happening.     I reviewed a CT scan of the abdomen and pelvis fr

## 2019-03-06 NOTE — PLAN OF CARE
Diabetes/Glucose Control    • Glucose maintained within prescribed range Not Progressing          NEUROLOGICAL - ADULT    • Achieves stable or improved neurological status Not Progressing    • Achieves maximal functionality and self care Not Progressing

## 2019-03-06 NOTE — PROGRESS NOTES
Gastroenterology Progress Note  Hipolito Fuelling Patient Status:  Inpatient    1965 MRN [id number]   [facility] 4SW-A Attending Juventino Berman MD   Hosp Day # 21 PCP Rosibel Keane MD     Chief difficulties, and large sacral decubitus ulceration admitted 2/14 with mental status changes c/b PD cath associated peritonitis, respiratory failure, and feeding difficulties.  She was tolerating TF via J-tube however now has watery diarrhea and CT c/a/p IV

## 2019-03-06 NOTE — PROGRESS NOTES
[address] [place] [address]  TEL: [phone]  FAX: [phone]    Jackie Palencia Patient Status:  Inpatient    1965 MRN [id number]   [facility] 4SW-A Attending Jessica Montez MD   Hosp Day # 21 PCP Madelaine Thompson MD 03/06/19   0456   GLU  277*  30*  349*   BUN  31*  12  20*   CREATSERUM  2.64*  1.19*  1.85*   GFRAA  23*  59*  35*   GFRNAA  20*  52*  30*   CA  8.0*  8.4*  7.1*   ALB   --   1.6*   --    NA  137  135*  131*   K  5.3*  3.3*  4.9   CL  102  99  96*   CO2 currently available. Ceftolozane/tazobactam* >8        * This antibiotic result is an MICHAEL. No interpretation is currently available. Ceftazidime/avibactam* >16        * This antibiotic result is an MICHAEL. No interpretation is currently available. suspect that + cx from ETT represents colonization rather than true infection. BCx neg,  line infection less likely. no intra abd abscess per CT, only with non specific changes  - cont empiric zosyn in view of SSI abd wall but d/c fluconazole.  Would plan o

## 2019-03-07 ENCOUNTER — APPOINTMENT (OUTPATIENT)
Dept: GENERAL RADIOLOGY | Facility: HOSPITAL | Age: 54
DRG: 003 | End: 2019-03-07
Attending: INTERNAL MEDICINE
Payer: MEDICARE

## 2019-03-07 LAB
ALBUMIN SERPL-MCNC: 1.3 G/DL (ref 3.4–5)
ALBUMIN/GLOB SERPL: 0.3 {RATIO} (ref 1–2)
ALP LIVER SERPL-CCNC: 630 U/L (ref 41–108)
ALT SERPL-CCNC: 23 U/L (ref 13–56)
ANION GAP SERPL CALC-SCNC: 11 MMOL/L (ref 0–18)
AST SERPL-CCNC: 31 U/L (ref 15–37)
BASOPHILS # BLD AUTO: 0.06 X10(3) UL (ref 0–0.2)
BASOPHILS NFR BLD AUTO: 0.3 %
BILIRUB SERPL-MCNC: 0.4 MG/DL (ref 0.1–2)
BUN BLD-MCNC: 12 MG/DL (ref 7–18)
BUN/CREAT SERPL: 10.9 (ref 10–20)
CALCIUM BLD-MCNC: 7.7 MG/DL (ref 8.5–10.1)
CHLORIDE SERPL-SCNC: 97 MMOL/L (ref 98–107)
CO2 SERPL-SCNC: 25 MMOL/L (ref 21–32)
CREAT BLD-MCNC: 1.1 MG/DL (ref 0.55–1.02)
DEPRECATED RDW RBC AUTO: 82.6 FL (ref 35.1–46.3)
EOSINOPHIL # BLD AUTO: 0 X10(3) UL (ref 0–0.7)
EOSINOPHIL NFR BLD AUTO: 0 %
ERYTHROCYTE [DISTWIDTH] IN BLOOD BY AUTOMATED COUNT: 23.3 % (ref 11–15)
GGT SERPL-CCNC: 952 U/L (ref 5–55)
GLOBULIN PLAS-MCNC: 4.9 G/DL (ref 2.8–4.4)
GLUCOSE BLD-MCNC: 149 MG/DL (ref 70–99)
GLUCOSE BLD-MCNC: 365 MG/DL (ref 70–99)
GLUCOSE BLD-MCNC: 396 MG/DL (ref 70–99)
GLUCOSE BLD-MCNC: 467 MG/DL (ref 70–99)
GLUCOSE BLD-MCNC: 502 MG/DL (ref 70–99)
GLUCOSE BLD-MCNC: 530 MG/DL (ref 70–99)
HAV IGM SER QL: 2.2 MG/DL (ref 1.6–2.6)
HCT VFR BLD AUTO: 27 % (ref 35–48)
HGB BLD-MCNC: 8.7 G/DL (ref 12–16)
IMM GRANULOCYTES # BLD AUTO: 0.42 X10(3) UL (ref 0–1)
IMM GRANULOCYTES NFR BLD: 2.1 %
LYMPHOCYTES # BLD AUTO: 1.01 X10(3) UL (ref 1–4)
LYMPHOCYTES NFR BLD AUTO: 5.1 %
M PROTEIN MFR SERPL ELPH: 6.2 G/DL (ref 6.4–8.2)
MCH RBC QN AUTO: 32.3 PG (ref 26–34)
MCHC RBC AUTO-ENTMCNC: 32.2 G/DL (ref 31–37)
MCV RBC AUTO: 100.4 FL (ref 80–100)
MONOCYTES # BLD AUTO: 1.54 X10(3) UL (ref 0.1–1)
MONOCYTES NFR BLD AUTO: 7.7 %
NEUTROPHILS # BLD AUTO: 16.89 X10 (3) UL (ref 1.5–7.7)
NEUTROPHILS # BLD AUTO: 16.89 X10(3) UL (ref 1.5–7.7)
NEUTROPHILS NFR BLD AUTO: 84.8 %
OSMOLALITY SERPL CALC.SUM OF ELEC: 291 MOSM/KG (ref 275–295)
OVA AND PARASITE, TRICHROME STAIN: NEGATIVE
OVA AND PARASITE, WET MOUNT: NEGATIVE
PHOSPHATE SERPL-MCNC: 3.8 MG/DL (ref 2.5–4.9)
PLATELET # BLD AUTO: 293 10(3)UL (ref 150–450)
POTASSIUM SERPL-SCNC: 3.9 MMOL/L (ref 3.5–5.1)
RBC # BLD AUTO: 2.69 X10(6)UL (ref 3.8–5.3)
SODIUM SERPL-SCNC: 133 MMOL/L (ref 136–145)
WBC # BLD AUTO: 19.9 X10(3) UL (ref 4–11)

## 2019-03-07 PROCEDURE — 99232 SBSQ HOSP IP/OBS MODERATE 35: CPT | Performed by: HOSPITALIST

## 2019-03-07 PROCEDURE — 71045 X-RAY EXAM CHEST 1 VIEW: CPT | Performed by: INTERNAL MEDICINE

## 2019-03-07 PROCEDURE — 99233 SBSQ HOSP IP/OBS HIGH 50: CPT | Performed by: INTERNAL MEDICINE

## 2019-03-07 RX ORDER — CISATRACURIUM BESYLATE 2 MG/ML
10 INJECTION INTRAVENOUS
Status: COMPLETED | OUTPATIENT
Start: 2019-03-08 | End: 2019-03-08

## 2019-03-07 NOTE — PROGRESS NOTES
03/06/19 2104   Vent Information   $ RT Standby Charge (per 15 min) 1  (vent check)   Ventilator Initiation 02/14/19   Ventilation Day(s) 21   Interface Invasive   Vent Type PB   Vent ID 4   Vent Mode VC+   Settings   FiO2 (%) 30 %   Resp Rate (Set) 12

## 2019-03-07 NOTE — PLAN OF CARE
Assumed patient care this morning at 0730. Pt intubated, full vent support. Patient drowsy/ opens eyes to physical/painful stimulus but does not track & pupils equal/nonreactive. Does not follow commands. Appears comfortable & tolerating ventilator.  RU stability and optimal renal function maintained Progressing

## 2019-03-07 NOTE — PLAN OF CARE
RESPIRATORY - ADULT    • Achieves optimal ventilation and oxygenation Not Progressing        SKIN/TISSUE INTEGRITY - ADULT    • Skin integrity remains intact Not Progressing    • Incision(s), wounds(s) or drain site(s) healing without S/S of infection Not

## 2019-03-07 NOTE — PROGRESS NOTES
[facility]  Nephrology Progress Note    Gonzales Zheng Attending:  Prerna Anna MD       Assessment and Plan:    1) ESRD- due to diabetes; PD cath removed due to Candida Glabrata in peritoneal fluid cx.  Will not resume PD at any point given FT and risk fo 03/07/2019    HCT 27.0 03/07/2019    .0 03/07/2019    CREATSERUM 1.10 03/07/2019    BUN 12 03/07/2019     03/07/2019    K 3.9 03/07/2019    CL 97 03/07/2019    CO2 25.0 03/07/2019     03/07/2019    CA 7.7 03/07/2019    ALB 1.3 03/07/201 Units Intravenous PRN Dialysis   Insulin Aspart Pen (NOVOLOG) 100 UNIT/ML flexpen 1-15 Units 1-15 Units Subcutaneous 4 times per day   Valproate Sodium (DEPAKENE) soln 750 mg 750 mg Per NG Tube 2 times per day   Dakins (1/2 strength) (Sodium hypochlorite (

## 2019-03-07 NOTE — PROGRESS NOTES
[facility]  Progress Note     Angela Ponce Patient Status:  Inpatient    1965 MRN [id number]   [facility] 4SW-A Attending Geovany Soni MD   Hosp Day # 21 PCP Yadiel Romero MD     Chief Complaint: unable to obtain due to intubat mL/min (A) (based on SCr of 1.1 mg/dL (H)). Recent Labs   Lab  03/05/19   1538  03/06/19   0457   PTP  17.2*  16.1*   INR  1.33*  1.23*       No results for input(s): TROP, CK in the last 168 hours. Imaging: Imaging data reviewed in Epic.     Med of trach pending  9. ESRD on HD  1. Renal on Cs, PD catheter removed. 10. Hypothyroidism  11. H/o CVA with left sided weakness   12. Difficulty tolerating TF s/p GJ tube extension on 2/27  13. Sacral/buttock, LLE stump, R ankle pressure sores POA  14.  Po

## 2019-03-07 NOTE — PROGRESS NOTES
[facility]  Progress Note    Lee Goldsmith Patient Status:  Inpatient    1965 MRN [id number]   [facility] 4SW-A Attending Sue Mujica MD   Hosp Day # 21 PCP Jose Alberto Saxena MD     STATUS UPDATE: It is the opinion of the [facility] normal.   Throat: Lips, mucosa, and tongue normal.  No thrush noted. Neck: Soft, supple neck; trachea midline, no adenopathy, no thyromegaly. Lungs: Clear to auscultation, peak pressure 13, plateau pressure 13   Chest wall: No tenderness or deformity. possible line infection or GI source although unclear, doubt pneumonia or UTI-has defervesced on Zosyn and Diflucan, cultures remain unremarkable     #Possible seizure activity rule out new CVA-neuro following.  She remains unresponsive and no ongoing seiz

## 2019-03-07 NOTE — WOUND PROGRESS NOTE
[facility]  Report of Inpatient Wound Care Progress Note    Kendra Gum Patient Status:  Inpatient    1965 MRN [id number]   [facility] 4SW-A Attending Johnnie Mar MD   Hosp Day # 21 PCP Jose Alberto Marquez MD       SUBJECTIVE:  Eyes o --    ALB  1.6*   --    --    --    --    --    --    --    --    --   1.3*   --    --    TP  6.3*   --    --    --    --    --    --    --    --    --   6.2*   --    --    INR   --    --    --    --   1.33*   --    --   1.23*   --    --    --    --    - [phone]  : [phone]

## 2019-03-07 NOTE — PLAN OF CARE
NEUROLOGICAL - ADULT    • Achieves stable or improved neurological status Not Progressing    • Achieves maximal functionality and self care Not Progressing          CARDIOVASCULAR - ADULT    • Absence of cardiac arrhythmias or at baseline Progressing

## 2019-03-07 NOTE — CM/SW NOTE
CM met with son Shantell Alegria in ICU conference room and updated him on MD's canceling ethics consult as consensus moving forward was to do trach as was planned and prepare for LTAC discharge.  Shantell Alegria grateful to know medical team is willing to move forward an

## 2019-03-07 NOTE — PROGRESS NOTES
[address] [facility]  TEL: [phone]  FAX: [phone]    Danny Tip Patient Status:  Inpatient    1965 MRN [id number]   [facility] 4SW-A Attending Ghazala Tang MD   Hosp Day # 21 PCP Sade Vera MD 03/05/19   0414  03/06/19   0456  03/07/19   0449   GLU  30*  349*  365*   BUN  12  20*  12   CREATSERUM  1.19*  1.85*  1.10*   GFRAA  59*  35*  65   GFRNAA  52*  30*  57*   CA  8.4*  7.1*  7.7*   ALB  1.6*   --   1.3*   NA  135*  131*  133*   K  3.3*  4.9 No interpretation is currently available. Ceftolozane/tazobactam* >8        * This antibiotic result is an MICHAEL. No interpretation is currently available. Ceftazidime/avibactam* >16        * This antibiotic result is an MICHAEL.  No interpretation is cur transfusions off and on  - CXR only with minimal bilat atelectasis. Has diarrhea but Cdiff neg. Large decub ulcer does not seem infected. suspect that + cx from ETT represents colonization rather than true infection. BCx neg,  line infection less likely.  n

## 2019-03-07 NOTE — CM/SW NOTE
Arrived to voice mail message from RN last evening. Per RN, son Sharon Yu is intending to attend Ethics Meeting today. CM updated Ethics Committee member Randall White.  Reaching out to physicians to see if they are able to attend [facility] now that family

## 2019-03-08 ENCOUNTER — APPOINTMENT (OUTPATIENT)
Dept: GENERAL RADIOLOGY | Facility: HOSPITAL | Age: 54
DRG: 003 | End: 2019-03-08
Attending: INTERNAL MEDICINE
Payer: MEDICARE

## 2019-03-08 LAB
ANION GAP SERPL CALC-SCNC: 11 MMOL/L (ref 0–18)
BASOPHIL BRONCHIAL WASHING: 0 %
BASOPHILS # BLD AUTO: 0.06 X10(3) UL (ref 0–0.2)
BASOPHILS NFR BLD AUTO: 0.3 %
BUN BLD-MCNC: 24 MG/DL (ref 7–18)
BUN/CREAT SERPL: 13.1 (ref 10–20)
CALCIUM BLD-MCNC: 7.8 MG/DL (ref 8.5–10.1)
CHLORIDE SERPL-SCNC: 97 MMOL/L (ref 98–107)
CO2 SERPL-SCNC: 27 MMOL/L (ref 21–32)
CREAT BLD-MCNC: 1.83 MG/DL (ref 0.55–1.02)
DEPRECATED RDW RBC AUTO: 84.9 FL (ref 35.1–46.3)
EOSINOPHIL # BLD AUTO: 0.07 X10(3) UL (ref 0–0.7)
EOSINOPHIL BRONCHIAL WASHING: 0 %
EOSINOPHIL NFR BLD AUTO: 0.4 %
ERYTHROCYTE [DISTWIDTH] IN BLOOD BY AUTOMATED COUNT: 22.9 % (ref 11–15)
GLUCOSE BLD-MCNC: 194 MG/DL (ref 70–99)
GLUCOSE BLD-MCNC: 198 MG/DL (ref 70–99)
GLUCOSE BLD-MCNC: 337 MG/DL (ref 70–99)
GLUCOSE BLD-MCNC: 387 MG/DL (ref 70–99)
GLUCOSE BLD-MCNC: 396 MG/DL (ref 70–99)
HCT VFR BLD AUTO: 26.9 % (ref 35–48)
HGB BLD-MCNC: 8.4 G/DL (ref 12–16)
IMM GRANULOCYTES # BLD AUTO: 0.24 X10(3) UL (ref 0–1)
IMM GRANULOCYTES NFR BLD: 1.4 %
INR BLD: 0.99 (ref 0.9–1.1)
LYMPHOCYTE BRONCHIAL WASHING: 2 %
LYMPHOCYTES # BLD AUTO: 1.81 X10(3) UL (ref 1–4)
LYMPHOCYTES NFR BLD AUTO: 10.3 %
MCH RBC QN AUTO: 31.9 PG (ref 26–34)
MCHC RBC AUTO-ENTMCNC: 31.2 G/DL (ref 31–37)
MCV RBC AUTO: 102.3 FL (ref 80–100)
MON/MACROPHAGE BRONCHIAL WASH: 1 %
MONOCYTES # BLD AUTO: 2.71 X10(3) UL (ref 0.1–1)
MONOCYTES NFR BLD AUTO: 15.5 %
NEUTROPHILS # BLD AUTO: 12.6 X10 (3) UL (ref 1.5–7.7)
NEUTROPHILS # BLD AUTO: 12.6 X10(3) UL (ref 1.5–7.7)
NEUTROPHILS BRONCHIAL WASHING: 97 %
NEUTROPHILS NFR BLD AUTO: 72.1 %
OSMOLALITY SERPL CALC.SUM OF ELEC: 297 MOSM/KG (ref 275–295)
PLATELET # BLD AUTO: 335 10(3)UL (ref 150–450)
POTASSIUM SERPL-SCNC: 3.7 MMOL/L (ref 3.5–5.1)
PSA SERPL DL<=0.01 NG/ML-MCNC: 13.5 SECONDS (ref 12.5–14.7)
RBC # BLD AUTO: 2.63 X10(6)UL (ref 3.8–5.3)
RBC BRONCHIAL WASHING: 408 /MM3
SODIUM SERPL-SCNC: 135 MMOL/L (ref 136–145)
TOTAL CELLS COUNTED: 100
WBC # BLD AUTO: 17.5 X10(3) UL (ref 4–11)
WBC BRONCHIAL WASHING: 2353 /MM3

## 2019-03-08 PROCEDURE — 71045 X-RAY EXAM CHEST 1 VIEW: CPT | Performed by: INTERNAL MEDICINE

## 2019-03-08 PROCEDURE — 99232 SBSQ HOSP IP/OBS MODERATE 35: CPT | Performed by: HOSPITALIST

## 2019-03-08 PROCEDURE — 0B978ZZ DRAINAGE OF LEFT MAIN BRONCHUS, VIA NATURAL OR ARTIFICIAL OPENING ENDOSCOPIC: ICD-10-PCS | Performed by: INTERNAL MEDICINE

## 2019-03-08 PROCEDURE — 99233 SBSQ HOSP IP/OBS HIGH 50: CPT | Performed by: INTERNAL MEDICINE

## 2019-03-08 PROCEDURE — 0B113F4 BYPASS TRACHEA TO CUTANEOUS WITH TRACHEOSTOMY DEVICE, PERCUTANEOUS APPROACH: ICD-10-PCS | Performed by: INTERNAL MEDICINE

## 2019-03-08 PROCEDURE — 0B938ZZ DRAINAGE OF RIGHT MAIN BRONCHUS, VIA NATURAL OR ARTIFICIAL OPENING ENDOSCOPIC: ICD-10-PCS | Performed by: INTERNAL MEDICINE

## 2019-03-08 DEVICE — CIAGLIA BLUE RHINO G2 ADVANCED PERCUTANEOUS TRACHEOSTOMY INTRODUCER TRAY
Type: IMPLANTABLE DEVICE | Status: FUNCTIONAL
Brand: CIAGLIA BLUE RHINO G2

## 2019-03-08 RX ORDER — MIDAZOLAM HYDROCHLORIDE 1 MG/ML
8 INJECTION INTRAMUSCULAR; INTRAVENOUS
Status: COMPLETED | OUTPATIENT
Start: 2019-03-08 | End: 2019-03-08

## 2019-03-08 NOTE — PLAN OF CARE
Tube feeding held at [facility] as preparation for Tracheostomy today. No change in mental status, opens eyes to voice and pain, able to move right arm but still weak. Afebrile. No acute changes.

## 2019-03-08 NOTE — CM/SW NOTE
CM called to POA son Adan Hoff and updated him with the [facility] information and that his mother is anticipated to d/c to [address] [place] [address] [place] over the weekend if medically cleared 24 hrs post trach. Adan Hoff states understanding and is agreeable.     Adan Hoff wants laura

## 2019-03-08 NOTE — DIETARY NOTE
NUTRITION Follow up  ASSESSMENT    Pt is at high nutrition risk. Pt does not meet malnutrition criteria.     NUTRITION DIAGNOSIS/PROBLEM:    Inadequate oral intake related to inability to consume sufficient energy as evidenced by the need for g tube feeding signs of intolerance (no abdominal distention).   Pt just restarted on TF with new formula Vital 1.2 laura, agree with TF formula change as this is semi-elemental easier to digest.   Of note pt is non responsive and unable to state any abdominal discomfort, p

## 2019-03-08 NOTE — PROGRESS NOTES
[address] [place] [address]  TEL: [phone]  FAX: [phone]    Jimenez Hazel Patient Status:  Inpatient    1965 MRN [id number]   [facility] 4SW-A Attending Ny Thorne MD   [address] [facility] Day # 25 PCP Kendra Weston MD --    --    --    NRBC  1*   --    --    --    --    --    --     < > = values in this interval not displayed.      Recent Labs   Lab  03/05/19   0414  03/06/19   0456  03/07/19   0449  03/08/19   0448   GLU  30*  349*  365*  337*   BUN  12  20*  12  24* >=128 Resistant      Tobramycin* >8 Resistant       * Multiple drug resistant organism (MDRO)     Trimethoprim/Sulfa >80 Resistant      Colistin <=2 Sensitive      Tigecycline* <=2        * This antibiotic result is an MICHAEL.  No interpretation is currently a peritonitis due to C glabrata- completed treatment. new CT shows no abscess    3) anoxic encephalopathy- MS not any better. Very poor prognosis    4) Resp failure- pulm following. CXR noted. Main barrier to extubation seems to be MS.  Does not seem to have

## 2019-03-08 NOTE — PROGRESS NOTES
[facility]  Progress Note     John Askew Patient Status:  Inpatient    1965 MRN [id number]   [facility] 4SW-A Attending Kirill Rios MD   Hosp Day # 25 PCP Julieth Lazar MD     Chief Complaint: unable to obtain due to intubat 500*   --   630*   --    AST  29   --   31   --    ALT  19   --   23   --    BILT  0.4   --   0.4   --    TP  6.3*   --   6.2*   --        Estimated Creatinine Clearance: 28.7 mL/min (A) (based on SCr of 1.83 mg/dL (H)).     Recent Labs   Lab  03/05/19   15 possible trach tomorrow. 7. CAD/CMP prior MI s/p stenting  8. PAF  9. PAD H/o Left BKA on plavix- on hold now in view of trach pending  10. ESRD on HD  1. Renal on Cs, PD catheter removed. 11. Hypothyroidism  12. H/o CVA with left sided weakness   13.

## 2019-03-08 NOTE — PROGRESS NOTES
[facility]  Progress Note    Eusebia Munroe Patient Status:  Inpatient    1965 MRN [id number]   [facility] 4SW-A Attending Jonh Bello MD   Hosp Day # 25 PCP Jose Alberto Toledo MD       Subjective:  Eusebia Munroe is a(n) 52year old female cyanosis   Neurological: Some movement of her right upper extremity, she occasionally opens her eyes. No interactive behaviors.     Lab Data Review:  Recent Labs   Lab  03/06/19   0456  03/07/19   0449  03/08/19   0448   RBC  2.61*  2.69*  2.63*   HGB  8.6 foreseeable future secondary to yeast peritonitis and removal of her intra-abdominal catheter.     #History of CVA/G-tube     #History of PAF on subcu heparin and Plavix      #History of MI/ischemic cardiomyopathy last EF on record 9/17 of 40% with normal

## 2019-03-08 NOTE — CM/SW NOTE
CM spoke with [facility] [place] Zoey Partida today to update on plan for trach at 2:30pm today. MD's here hope to resume plavix tomorrow if no issues S/P trach. Zoey Partida working to get a bed assigned there at [facility], as weekend discharge anticipated.  ([facility]

## 2019-03-08 NOTE — PLAN OF CARE
GASTROINTESTINAL - ADULT    • Maintains adequate nutritional intake (undernourished) Progressing        METABOLIC/FLUID AND ELECTROLYTES - ADULT    • Glucose maintained within prescribed range Progressing        RISK FOR INFECTION - ADULT    • Absence of f

## 2019-03-08 NOTE — OPERATIVE REPORT
Taiwo 6957 Patient Status:  Inpatient    1965 MRN [id number]   [facility] ENDOSCOPY Attending Jonh Bello MD   [facility] Day # 25 PCP Karen Hood MD     OPERATIVE REPORT     DATE OF OPERATION: 19      PREOPERAT tracheal surface between the second and third tracheal rings and visualized bronchoscopically. A guidewire was placed through the needle and needle withdrawn.  A 14-Croatian punch dilator was placed over the guidewire and removed followed by placement of a ta

## 2019-03-08 NOTE — PROGRESS NOTES
[facility]  Nephrology Progress Note    Maye Denver Attending:  Nasreen Kurtz MD       Assessment and Plan:    1) ESRD- due to diabetes; PD cath removed due to Candida Glabrata in peritoneal fluid cx.  Will not resume PD at any point given FT and risk fo HGB 8.4 03/08/2019    HCT 26.9 03/08/2019    .0 03/08/2019    CREATSERUM 1.83 03/08/2019    BUN 24 03/08/2019     03/08/2019    K 3.7 03/08/2019    CL 97 03/08/2019    CO2 27.0 03/08/2019     03/08/2019    CA 7.8 03/08/2019    INR 0. Pen (NOVOLOG) 100 UNIT/ML flexpen 1-15 Units 1-15 Units Subcutaneous 4 times per day   Valproate Sodium (DEPAKENE) soln 750 mg 750 mg Per NG Tube 2 times per day   Dakins (1/2 strength) (Sodium hypochlorite (1/2 strength)) 0.2-0.25 % external solution  Top

## 2019-03-09 ENCOUNTER — APPOINTMENT (OUTPATIENT)
Dept: GENERAL RADIOLOGY | Facility: HOSPITAL | Age: 54
DRG: 003 | End: 2019-03-09
Attending: INTERNAL MEDICINE
Payer: MEDICARE

## 2019-03-09 VITALS
HEIGHT: 63 IN | OXYGEN SATURATION: 100 % | DIASTOLIC BLOOD PRESSURE: 52 MMHG | HEART RATE: 99 BPM | BODY MASS INDEX: 22.46 KG/M2 | WEIGHT: 126.75 LBS | SYSTOLIC BLOOD PRESSURE: 143 MMHG | TEMPERATURE: 99 F | RESPIRATION RATE: 13 BRPM

## 2019-03-09 LAB
ANION GAP SERPL CALC-SCNC: 10 MMOL/L (ref 0–18)
BASOPHILS # BLD AUTO: 0.09 X10(3) UL (ref 0–0.2)
BASOPHILS NFR BLD AUTO: 0.4 %
BLOOD TYPE BARCODE: 7300
BUN BLD-MCNC: 15 MG/DL (ref 7–18)
BUN/CREAT SERPL: 9.9 (ref 10–20)
CALCIUM BLD-MCNC: 7.8 MG/DL (ref 8.5–10.1)
CHLORIDE SERPL-SCNC: 98 MMOL/L (ref 98–107)
CO2 SERPL-SCNC: 29 MMOL/L (ref 21–32)
CREAT BLD-MCNC: 1.52 MG/DL (ref 0.55–1.02)
DEPRECATED RDW RBC AUTO: 86.5 FL (ref 35.1–46.3)
EOSINOPHIL # BLD AUTO: 0.02 X10(3) UL (ref 0–0.7)
EOSINOPHIL NFR BLD AUTO: 0.1 %
ERYTHROCYTE [DISTWIDTH] IN BLOOD BY AUTOMATED COUNT: 23.5 % (ref 11–15)
GLUCOSE BLD-MCNC: 221 MG/DL (ref 70–99)
GLUCOSE BLD-MCNC: 246 MG/DL (ref 70–99)
GLUCOSE BLD-MCNC: 258 MG/DL (ref 70–99)
GLUCOSE BLD-MCNC: 343 MG/DL (ref 70–99)
HAV IGM SER QL: 2.1 MG/DL (ref 1.6–2.6)
HCT VFR BLD AUTO: 27 % (ref 35–48)
HGB BLD-MCNC: 8.5 G/DL (ref 12–16)
IMM GRANULOCYTES # BLD AUTO: 0.36 X10(3) UL (ref 0–1)
IMM GRANULOCYTES NFR BLD: 1.6 %
LYMPHOCYTES # BLD AUTO: 2.56 X10(3) UL (ref 1–4)
LYMPHOCYTES NFR BLD AUTO: 11.3 %
MCH RBC QN AUTO: 32.6 PG (ref 26–34)
MCHC RBC AUTO-ENTMCNC: 31.5 G/DL (ref 31–37)
MCV RBC AUTO: 103.4 FL (ref 80–100)
MONOCYTES # BLD AUTO: 2.81 X10(3) UL (ref 0.1–1)
MONOCYTES NFR BLD AUTO: 12.4 %
NEUTROPHILS # BLD AUTO: 16.78 X10 (3) UL (ref 1.5–7.7)
NEUTROPHILS # BLD AUTO: 16.78 X10(3) UL (ref 1.5–7.7)
NEUTROPHILS NFR BLD AUTO: 74.2 %
OSMOLALITY SERPL CALC.SUM OF ELEC: 292 MOSM/KG (ref 275–295)
PHOSPHATE SERPL-MCNC: 3.3 MG/DL (ref 2.5–4.9)
PLATELET # BLD AUTO: 411 10(3)UL (ref 150–450)
POTASSIUM SERPL-SCNC: 3.6 MMOL/L (ref 3.5–5.1)
RBC # BLD AUTO: 2.61 X10(6)UL (ref 3.8–5.3)
SODIUM SERPL-SCNC: 137 MMOL/L (ref 136–145)
WBC # BLD AUTO: 22.6 X10(3) UL (ref 4–11)

## 2019-03-09 PROCEDURE — 99233 SBSQ HOSP IP/OBS HIGH 50: CPT | Performed by: INTERNAL MEDICINE

## 2019-03-09 PROCEDURE — 71045 X-RAY EXAM CHEST 1 VIEW: CPT | Performed by: INTERNAL MEDICINE

## 2019-03-09 PROCEDURE — 99239 HOSP IP/OBS DSCHRG MGMT >30: CPT | Performed by: HOSPITALIST

## 2019-03-09 RX ORDER — LEVOTHYROXINE SODIUM 0.12 MG/1
125 TABLET ORAL
Qty: 1 TABLET | Refills: 0 | Status: SHIPPED | OUTPATIENT
Start: 2019-03-10

## 2019-03-09 RX ORDER — BISACODYL 10 MG
10 SUPPOSITORY, RECTAL RECTAL
Qty: 30 SUPPOSITORY | Refills: 0 | Status: SHIPPED | OUTPATIENT
Start: 2019-03-09

## 2019-03-09 RX ORDER — LOPERAMIDE HCL 1 MG/7.5ML
2 SOLUTION ORAL 4 TIMES DAILY PRN
Qty: 1 BOTTLE | Refills: 0 | Status: SHIPPED | OUTPATIENT
Start: 2019-03-09

## 2019-03-09 RX ORDER — HEPARIN SODIUM 5000 [USP'U]/ML
5000 INJECTION, SOLUTION INTRAVENOUS; SUBCUTANEOUS EVERY 12 HOURS SCHEDULED
Status: DISCONTINUED | OUTPATIENT
Start: 2019-03-09 | End: 2019-03-09

## 2019-03-09 RX ORDER — MINERAL OIL AND PETROLATUM 150; 830 MG/G; MG/G
1 OINTMENT OPHTHALMIC 3 TIMES DAILY
Qty: 1 TUBE | Refills: 0 | Status: SHIPPED | OUTPATIENT
Start: 2019-03-09

## 2019-03-09 RX ORDER — LEVETIRACETAM 100 MG/ML
500 SOLUTION ORAL EVERY 12 HOURS
Qty: 1 BOTTLE | Refills: 0 | Status: SHIPPED | OUTPATIENT
Start: 2019-03-09

## 2019-03-09 RX ORDER — VALPROIC ACID 250 MG/5ML
750 SOLUTION ORAL EVERY 12 HOURS
Qty: 900 ML | Refills: 0 | Status: SHIPPED | OUTPATIENT
Start: 2019-03-09 | End: 2019-04-08

## 2019-03-09 RX ORDER — IPRATROPIUM BROMIDE AND ALBUTEROL SULFATE 2.5; .5 MG/3ML; MG/3ML
3 SOLUTION RESPIRATORY (INHALATION)
Qty: 1 VIAL | Refills: 0 | Status: SHIPPED | OUTPATIENT
Start: 2019-03-09

## 2019-03-09 RX ORDER — CLOPIDOGREL BISULFATE 75 MG/1
75 TABLET ORAL DAILY
Qty: 1 TABLET | Refills: 0 | Status: SHIPPED | OUTPATIENT
Start: 2019-03-13

## 2019-03-09 NOTE — PROGRESS NOTES
[facility]  Nephrology Progress Note    Jackie Palencia Attending:  Jessica Montez MD       Assessment and Plan:    1) ESRD- due to diabetes; PD cath removed due to Candida Glabrata in peritoneal fluid cx.  Will not resume PD at any point given FT and risk fo HCT 27.0 03/09/2019    .0 03/09/2019    CREATSERUM 1.52 03/09/2019    BUN 15 03/09/2019     03/09/2019    K 3.6 03/09/2019    CL 98 03/09/2019    CO2 29.0 03/09/2019     03/09/2019    CA 7.8 03/09/2019    MG 2.1 03/09/2019    PHOS 3. 0.2-0.25 % external solution  Topical BID   ipratropium-albuterol (DUONEB) nebulizer solution 3 mL 3 mL Nebulization Q4H PRN   ondansetron HCl (ZOFRAN) injection 4 mg 4 mg Intravenous Q6H PRN   PEG 3350 (MIRALAX) powder packet 17 g 17 g Oral Daily PRN   bi

## 2019-03-09 NOTE — PROGRESS NOTES
[facility]  Progress Note    Matilda Sparks Patient Status:  Inpatient    1965 MRN [id number]   [facility] 4SW-A Attending Woody Dash MD   Hosp Day # 21 PCP Jose Alberto Lopez MD       Subjective:  Matilda Sparks is a(n) 52year old female murmur. Abdomen: Soft, non-tender, non-distended, no masses, no guarding, no   rebound, hypoactive BS   Extremity: No edema, no cyanosis, s/p L BKA   Neurological: Some movement of her right upper extremity, she occasionally opens her eyes.   No interacti noted above she is now on Zosyn.     #History of end-stage renal disease with renal following.  On hemodialysis for the foreseeable future secondary to yeast peritonitis and removal of her intra-abdominal catheter.     #History of CVA/G-tube     #History o

## 2019-03-09 NOTE — PROGRESS NOTES
[address] [place] [address]  TEL: [phone]  FAX: [phone]    Nohemi Able Patient Status:  Inpatient    1965 MRN [id number]   [facility] 4SW-A Attending Albina Rhodes MD   [address] [facility] Day # 21 PCP Alicia Antonio MD NRBC  1*   --    --    --    --    --    --     < > = values in this interval not displayed.      Recent Labs   Lab  03/05/19   0414   03/07/19   0449  03/08/19   0448  03/09/19   0424   GLU  30*   < >  365*  337*  221*   BUN  12   < >  12  24*  15   CREA Resistant      Gentamicin >=16 Resistant      Meropenem >8 Resistant      Levofloxacin >=8 Resistant      Piperacillin + Tazobactam >=128 Resistant      Tobramycin* >8 Resistant       * Multiple drug resistant organism (MDRO)     Trimethoprim/Sulfa >80 Res opacity within the left lung base   and tiny left effusion. Impression: CONCLUSION:   1. Lines and tubes unchanged in position. 2. Stable interstitial edema and atelectasis versus pneumonia at the left lung base. Stable small left effusion.       Dictated

## 2019-03-09 NOTE — PLAN OF CARE
Assumed care of patient at 0730. Opens eyes at times; not following commands, non purposeful movement RUE, at times, as well. Trach to vent, tolerating very well. Wound care performed as per order to both backside and abdomen.  Patient to transfer to [facility]

## 2019-03-09 NOTE — PROGRESS NOTES
Ok to d/c to [facility] today if ok with all specialists. No changes clinically with patient, except WBCs slightly up today. Await ID recs.

## 2019-03-09 NOTE — PROGRESS NOTES
03/09/19 0518   Vent Information   $ RT Standby Charge (per 15 min) 1   Ventilator Initiation 02/14/19   Ventilation Day(s) 24   Interface Invasive   Vent Type PB   Vent -4   Vent Mode VC+   Settings   FiO2 (%) 30 %   Resp Rate (Set) 12   Vt (Set,

## 2019-03-10 NOTE — DISCHARGE SUMMARY
[facility] HOSPITALIST  DISCHARGE SUMMARY     Matilda Sparks Patient Status:  Inpatient    1965 MRN [id number]   [facility] 4SW-A Attending Dr. Noemi De Leon # 21 PCP Jose Alberto Lopez MD     Date of Admission: 2019  Date of [address] occasions during the stay. She opens eyes with intermittent tracking, occasional RUE movement. Wound care assisted with management of abdominal wound and  Sacral/right ankle/left lower extremity stump ulcers.  There was concern for infected peritoneal dial recording. Some were superimposed with triphasic waves which can be seen in toxic metabolic encephalopathy. There were also periods of time that appeared a pattern of burst suppression to certain degree.  Initial myoclonic jerking movement were significantl care, infectious disease, nephrology, neurology, wound care, GI         Discharge Medications      START taking these medications      Instructions Prescription details   artificial tears 83-15 % Oint      Place 1 Application into both eyes 3 (three) times Quantity:  1 tablet  Refills:  0     insulin aspart cartridge 100 UNIT/ML Soct  Commonly known as:  Noa Fong  What changed:  how much to take      Inject 2-10 Units into the skin See Admin Instructions.  Per Sliding Scale 151-200= 2 units,  201-250=4 metRONIDAZOLE 500 MG Tabs  Commonly known as:  FLAGYL        Midodrine HCl 10 MG Tabs  Commonly known as:  PROAMATINE        modafinil 100 MG Tabs  Commonly known as:  PROVIGIL        Ondansetron HCl 24 MG Tabs  Commonly known as:  Ruben Rea assessment and plan for this patient.     Van Meadows MD  [facility]  Internal Medicine Hospitalist  Pager [phone]  Cell [phone]

## 2019-03-11 LAB
ASPERGILLUS GALACTOMANNAN AG, BAL: NEGATIVE
ASPERGILLUS GALACTOMANNAN INDX: 0.11

## 2019-03-12 LAB
CYTOMEGALOVIRUS DETECTION, PCR: DETECTED
HSV 1 SUBTYPE BY PCR: NOT DETECTED
HSV 2 SUBTYPE BY PCR: NOT DETECTED

## 2019-03-13 LAB — P. JIROVECII DETECTION BY PCR: NOT DETECTED

## 2021-11-29 NOTE — CM/SW NOTE
For [facility] discharge:    Pt's room # 244. Nursing report phone: [phone]  Accepting MD Dr. Chalo Sapp  Fax discharge [facility] to [phone]  Call [facility] Supervisor to confirm transport time: [phone].     Be sure to send imaging [facility] room air

## (undated) DEVICE — SUTURE SILK 2-0 SH

## (undated) DEVICE — Device: Brand: DEFENDO AIR/WATER/SUCTION AND BIOPSY VALVE

## (undated) DEVICE — GLOVE SURG SENSICARE SZ 7-1/2

## (undated) DEVICE — POOLE SUCTION HANDLE: Brand: CARDINAL HEALTH

## (undated) DEVICE — 3M™ RED DOT™ MONITORING ELECTRODE WITH FOAM TAPE AND STICKY GEL, 50/BAG, 20/CASE, 72/PLT 2570: Brand: RED DOT™

## (undated) DEVICE — BOWLS UTILITY 16OZ

## (undated) DEVICE — STERILE POLYISOPRENE POWDER-FREE SURGICAL GLOVES: Brand: PROTEXIS

## (undated) DEVICE — 60 ML SYRINGE REGULAR TIP: Brand: MONOJECT

## (undated) DEVICE — SINGLE USE BIOPSY VALVE MAJ-210: Brand: SINGLE USE BIOPSY VALVE (STERILE)

## (undated) DEVICE — CLIP RESOLUTION 235CM

## (undated) DEVICE — DRESSING FOAM AQUACEL AG 4X4

## (undated) DEVICE — TOWEL: OR BLU 80/CS: Brand: MEDICAL ACTION INDUSTRIES

## (undated) DEVICE — PREMIUM WET SKIN PREP TRAY: Brand: MEDLINE INDUSTRIES, INC.

## (undated) DEVICE — FILTERLINE NASAL ADULT O2/CO2

## (undated) DEVICE — LENS PLASTIC DISP EYEWEAR

## (undated) DEVICE — 3M™ TEGADERM™ TRANSPARENT FILM DRESSING, 1626W, 4 IN X 4-3/4 IN (10 CM X 12 CM), 50 EACH/CARTON, 4 CARTON/CASE: Brand: 3M™ TEGADERM™

## (undated) DEVICE — MEDI-VAC NON-CONDUCTIVE SUCTION TUBING: Brand: CARDINAL HEALTH

## (undated) DEVICE — 1200CC GUARDIAN II: Brand: GUARDIAN

## (undated) DEVICE — ENDOSCOPY PACK UPPER: Brand: MEDLINE INDUSTRIES, INC.

## (undated) DEVICE — Device

## (undated) DEVICE — LENS FRAMES DISP EYEWEAR

## (undated) DEVICE — MEDI-VAC SUCTION HANDLE REGULAR CAPACITY: Brand: CARDINAL HEALTH

## (undated) DEVICE — MINI LAP PACK-LF: Brand: MEDLINE INDUSTRIES, INC.

## (undated) DEVICE — TRAP SPECIMEN MUCOUS 70 CUBIC CENTIMETER POLYURETHANE STERILE NOT MADE WITH NATURAL RUBBER LATEX MEDICHOICE: Brand: MEDICHOICE

## (undated) DEVICE — SNARE CAPTIFLEX MICRO-OVL OLY

## (undated) DEVICE — DEVICE SPEC RTRVL RPTR 2.4MM

## (undated) DEVICE — SYRINGE 10ML SLIP TIP

## (undated) DEVICE — SOL  .9 1000ML BTL

## (undated) DEVICE — REM POLYHESIVE ADULT PATIENT RETURN ELECTRODE: Brand: VALLEYLAB

## (undated) DEVICE — FLUIDGARD® 160 ANTI-FOG SURGICAL MASK WITH ANTI-GLARE SHIELD: Brand: PRECEPT ®

## (undated) DEVICE — VIOLET BRAIDED (POLYGLACTIN 910), SYNTHETIC ABSORBABLE SUTURE: Brand: COATED VICRYL

## (undated) DEVICE — GAUZE SPONGES,USP TYPE VII GAUZE, 12 PLY: Brand: CURITY

## (undated) DEVICE — FORCEP RADIAL JAW 4

## (undated) NOTE — LETTER
Brenda Boggs [address]  [place], [address] [place] [address]    Consent for Operation  Date: _3/7/2019_________________                                Time: _1230______________    1.  I authorize the performance upon Gonzales Zheng the following operation:  Pr procedure has been videotaped, the surgeon will obtain the original videotape. The hospital will not be responsible for storage or maintenance of this tape.   6. For the purpose of advancing medical education, I consent to the admittance of observers to the STATEMENTS REQUIRING INSERTION OR COMPLETION WERE FILLED IN.     Signature of Patient:   ___________________________    When the patient is a minor or mentally incompetent to give consent:  Signature of person authorized to consent for patient: ____________ drugs/illegal medications). Failure to inform my anesthesiologist about these medicines may increase my risk of anesthetic complications. iv. If I am allergic to anything or have had a reaction to anesthesia before.   3. I understand how the anesthesia med I have discussed the procedure and information above with the patient (or patient’s representative) and answered their questions. The patient or their representative has agreed to have anesthesia services.     _______________________________________________

## (undated) NOTE — LETTER
[facility] [address], [address] [place] [address]    Consent for Anesthesia   1.    Julain Angulo agree to be cared for by an anesthesiologist, who is specially trained to monitor me and give me medicine to put me to sleep or keep me comfortable d vision, nerves, or muscles and in extremely rare instances death. 5. My doctor has explained to me other choices available to me for my care (alternatives).   6. Pregnant Patients (“epidural”):  I understand that the risks of having an epidural (medicine g

## (undated) NOTE — LETTER
Consent to Procedure/Sedation    Date: __________________    Time: _______________    1. I authorize the performance upon Sapphire Cuevas the followin.  I authorize Dr. _________________________ (and whomever is designated as the doctor’s assistant), ___________________________    ___________________    Witness: ____________________     Date: ______________    Printed: 2019   1:37 PM    Patient Name: Darin Rizo        : 1965       Medical Record #: [id number]

## (undated) NOTE — LETTER
Brenda Boggs [address] 6 [address]  [place], [address] [facility]    Consent for Operation  Date: __________________                                Time: _______________    1.  I authorize the performance upon Matilda Sparks the following operation:  Procedure(s): procedure has been videotaped, the surgeon will obtain the original videotape. The hospital will not be responsible for storage or maintenance of this tape.     6. For the purpose of advancing medical education, I consent to the admittance of observers to t STATEMENTS REQUIRING INSERTION OR COMPLETION WERE FILLED IN.     Signature of Patient:   ___________________________    When the patient is a minor or mentally incompetent to give consent:  Signature of person authorized to consent for patient: ____________

## (undated) NOTE — LETTER
Brenda Boggs 182 6 [address] [address]  [place], [address] [facility]    Consent for Operation  Date: __________________                                Time: _______________    1.  I authorize the performance upon Hipolito Fuelling the following operation:  Procedure(s): procedure has been videotaped, the surgeon will obtain the original videotape. The hospital will not be responsible for storage or maintenance of this tape.   6. For the purpose of advancing medical education, I consent to the admittance of observers to the STATEMENTS REQUIRING INSERTION OR COMPLETION WERE FILLED IN.     Signature of Patient:   ___________________________    When the patient is a minor or mentally incompetent to give consent:  Signature of person authorized to consent for patient: ____________ drugs/illegal medications). Failure to inform my anesthesiologist about these medicines may increase my risk of anesthetic complications. iv. If I am allergic to anything or have had a reaction to anesthesia before.   3. I understand how the anesthesia med I have discussed the procedure and information above with the patient (or patient’s representative) and answered their questions. The patient or their representative has agreed to have anesthesia services.     _______________________________________________

## (undated) NOTE — LETTER
Brenda Boggs [address] 6 [address]  [place], [address] [facility]    Consent for Operation  Date: __________________                                Time: _______________    1.  I authorize the performance upon Karlie Layton the following operation:  Procedure(s): procedure has been videotaped, the surgeon will obtain the original videotape. The hospital will not be responsible for storage or maintenance of this tape.   7. For the purpose of advancing medical education, I consent to the admittance of observers to the STATEMENTS REQUIRING INSERTION OR COMPLETION WERE FILLED IN.     Signature of Patient:   ___________________________    When the patient is a minor or mentally incompetent to give consent:  Signature of person authorized to consent for patient: ____________ drugs/illegal medications). Failure to inform my anesthesiologist about these medicines may increase my risk of anesthetic complications. iv. If I am allergic to anything or have had a reaction to anesthesia before.   3. I understand how the anesthesia med I have discussed the procedure and information above with the patient (or patient’s representative) and answered their questions. The patient or their representative has agreed to have anesthesia services.     _______________________________________________

## (undated) NOTE — LETTER
Brenda Boggs [address]  [place] [facility]    Consent for Operation  Date: __________________                                Time: _______________    1.  I authorize the performance upon Jackie Palencia the following operation:  Procedure(s): revealed by the pictures or by descriptive texts accompanying them. If the procedure has been videotaped, the surgeon will obtain the original videotape. The hospital will not be responsible for storage or maintenance of this tape.   6. For the purpose of a THAT MY DOCTOR PROVIDED ME WITH THE ABOVE EXPLANATIONS, THAT ALL BLANKS OR STATEMENTS REQUIRING INSERTION OR COMPLETION WERE FILLED IN.     Signature of Patient:   ___________________________    When the patient is a minor or mentally incompetent to give co supplements, and pills I can buy without a prescription (including street drugs/illegal medications). Failure to inform my anesthesiologist about these medicines may increase my risk of anesthetic complications. iv.  If I am allergic to anything or have ha Anesthesiologist Signature     Date   Time  I have discussed the procedure and information above with the patient (or patient’s representative) and answered their questions. The patient or their representative has agreed to have anesthesia services.     ___

## (undated) NOTE — LETTER
3/7/2019          To Whom It May Concern:    Amara Ashraf is currently under my medical care at [facility] ICU.     Please excuse Selma Shane from work today (3/7/2019), as he was required to be present for a meeting with the care team regarding plan

## (undated) NOTE — LETTER
Brenda Boggs [address] 6 [address]  [place], [address] [facility]    Consent for Operation  Date: __________________                                Time: _______________    1.  I authorize the performance upon John Askew the following operation:  Procedure(s): procedure has been videotaped, the surgeon will obtain the original videotape. The hospital will not be responsible for storage or maintenance of this tape.   7. For the purpose of advancing medical education, I consent to the admittance of observers to the STATEMENTS REQUIRING INSERTION OR COMPLETION WERE FILLED IN.     Signature of Patient:   ___________________________    When the patient is a minor or mentally incompetent to give consent:  Signature of person authorized to consent for patient: ____________ drugs/illegal medications). Failure to inform my anesthesiologist about these medicines may increase my risk of anesthetic complications. iv. If I am allergic to anything or have had a reaction to anesthesia before.   3. I understand how the anesthesia med I have discussed the procedure and information above with the patient (or patient’s representative) and answered their questions. The patient or their representative has agreed to have anesthesia services.     _______________________________________________